# Patient Record
Sex: MALE | Race: WHITE | Employment: OTHER | URBAN - METROPOLITAN AREA
[De-identification: names, ages, dates, MRNs, and addresses within clinical notes are randomized per-mention and may not be internally consistent; named-entity substitution may affect disease eponyms.]

---

## 2020-11-17 ENCOUNTER — OFFICE VISIT (OUTPATIENT)
Dept: PRIMARY CARE CLINIC | Age: 55
End: 2020-11-17
Payer: COMMERCIAL

## 2020-11-17 VITALS
HEART RATE: 71 BPM | BODY MASS INDEX: 33.74 KG/M2 | HEIGHT: 71 IN | RESPIRATION RATE: 18 BRPM | OXYGEN SATURATION: 98 % | SYSTOLIC BLOOD PRESSURE: 108 MMHG | TEMPERATURE: 98.5 F | DIASTOLIC BLOOD PRESSURE: 60 MMHG | WEIGHT: 241 LBS

## 2020-11-17 DIAGNOSIS — E29.1 HYPOGONADISM MALE: ICD-10-CM

## 2020-11-17 DIAGNOSIS — G44.209 TENSION HEADACHE: ICD-10-CM

## 2020-11-17 DIAGNOSIS — N63.0 BREAST LUMP: Primary | ICD-10-CM

## 2020-11-17 DIAGNOSIS — N62 GYNECOMASTIA, MALE: ICD-10-CM

## 2020-11-17 PROCEDURE — 99204 OFFICE O/P NEW MOD 45 MIN: CPT | Performed by: NURSE PRACTITIONER

## 2020-11-17 RX ORDER — LISINOPRIL AND HYDROCHLOROTHIAZIDE 12.5; 2 MG/1; MG/1
TABLET ORAL
COMMUNITY
Start: 2020-10-04 | End: 2021-07-20 | Stop reason: SDUPTHER

## 2020-11-17 RX ORDER — HYDROGEN PEROXIDE 3 %
20 SOLUTION, NON-ORAL MISCELLANEOUS 2 TIMES DAILY
COMMUNITY
End: 2022-10-18 | Stop reason: ALTCHOICE

## 2020-11-17 RX ORDER — TESTOSTERONE CYPIONATE 100 MG/ML
INJECTION, SOLUTION INTRAMUSCULAR ONCE
COMMUNITY
End: 2020-11-23 | Stop reason: SDUPTHER

## 2020-11-17 RX ORDER — ATORVASTATIN CALCIUM 20 MG/1
20 TABLET, FILM COATED ORAL DAILY
COMMUNITY
Start: 2020-10-04 | End: 2021-06-23 | Stop reason: SDUPTHER

## 2020-11-17 NOTE — PROGRESS NOTES
HISTORY OF PRESENT ILLNESS  Shruthi Wylie is a 48 y.o. male presents to [facility] care and for knot under breast.    1.  Knot under nipple:  Patient reported that he has had a knot under left nipple for about 6 months. Patient reported that the knot is very tender and painful. Patient denies discharge from the nipple. Patient notes the pain is like someone stabbing him with a needle in his left nipple. 2. Headaches:  Patient reported that he has been having headaches for about 6 months as well. Patient reported he gets pain behind his eyes. Patient has been using advil with some relief in headaches. Patient also notes blurry vision with headaches. Has been to ent and they told him this isn't related to his sinus. Patient hasn't been to an eye doctor in years. Denies nausea. 3. Low testosterone:  Patient reported that he been having low testosterone. Stopped his testosterone when he started having the nipple pain but he's feeling fatigued and depressed since being off of it. Vitals:    11/17/20 1015   BP: 108/60   Pulse: 71   Resp: 18   Temp: 98.5 °F (36.9 °C)   TempSrc: Temporal   SpO2: 98%   Weight: 241 lb (109.3 kg)   Height: 5' 11\" (1.803 m)     There is no problem list on file for this patient. There are no active problems to display for this patient. Current Outpatient Medications   Medication Sig Dispense Refill    atorvastatin (LIPITOR) 20 mg tablet Take 20 mg by mouth daily.  lisinopril-hydroCHLOROthiazide (PRINZIDE, ZESTORETIC) 20-12.5 mg per tablet TAKE 1 TABLET BY MOUTH EVERY DAY      esomeprazole (NexIUM) 20 mg capsule Take 20 mg by mouth two (2) times a day.  testosterone cypionate (DEPO-TESTOSTERONE) 100 mg/mL injection by IntraMUSCular route once.        Allergies   Allergen Reactions    Codeine Hives    Penicillins Other (comments)     Past Medical History:   Diagnosis Date    Arthritis     left and Rt knee    GERD (gastroesophageal reflux disease)     Headache     Hypercholesterolemia     Hypertension     Testosterone deficiency in male      Past Surgical History:   Procedure Laterality Date    HX APPENDECTOMY      HX CYST REMOVAL Left     wrist    HX OTHER SURGICAL Left     knee surgery    HX OTHER SURGICAL      Left shoulder surgery    HX TONSILLECTOMY       Family History   Problem Relation Age of Onset    Diabetes Mother     Lung Disease Mother     Cancer Mother         colon    Arthritis-osteo Mother     Lung Cancer Maternal Grandmother      Social History     Tobacco Use    Smoking status: Never Smoker    Smokeless tobacco: Current User    Tobacco comment: pt vape   Substance Use Topics    Alcohol use: Never     Frequency: Never           Review of Systems   Constitutional: Negative. HENT: Negative. Eyes: Negative. Respiratory: Negative for shortness of breath. Cardiovascular: Negative for chest pain and palpitations. Gastrointestinal: Negative. Genitourinary: Negative. Musculoskeletal: Negative. Skin: Negative. Neurological: Positive for headaches. Negative for dizziness. Physical Exam  Constitutional:       Appearance: Normal appearance. He is obese. HENT:      Head: Normocephalic. Right Ear: Tympanic membrane and ear canal normal.      Left Ear: Tympanic membrane and ear canal normal.   Eyes:      Conjunctiva/sclera: Conjunctivae normal.      Pupils: Pupils are equal, round, and reactive to light. Neck:      Musculoskeletal: Normal range of motion. Cardiovascular:      Rate and Rhythm: Normal rate. Pulses: Normal pulses. Pulmonary:      Effort: Pulmonary effort is normal.      Breath sounds: Normal breath sounds. Chest:      Breasts:         Right: Tenderness present. Left: Mass (small lump under nipple) and tenderness present. Skin:     General: Skin is warm and dry. Neurological:      Mental Status: He is alert and oriented to person, place, and time.    Psychiatric: Mood and Affect: Mood normal.         Behavior: Behavior normal.           ASSESSMENT and PLAN  Diagnoses and all orders for this visit:    1. Breast lump  Comments:  most likely related to gynecomastia but will check US to r/o other causes such as malignancy or cyst.   Orders:  -     TESTOSTERONE, FREE & TOTAL  -     ESTROGENS, FRACTIONATED  -     US BREAST LT LIMITED=<3 QUAD; Future  -     METABOLIC PANEL, COMPREHENSIVE  -     CBC WITH AUTOMATED DIFF    2. Hypogonadism male  Comments:  checking testosterone level to get him restarted on meds. Orders:  -     TESTOSTERONE, FREE & TOTAL  -     METABOLIC PANEL, COMPREHENSIVE    3. Tension headache  Comments:  offered fioricet but he declined. Discussed headaches may be from low testosterone. Orders:  -     METABOLIC PANEL, COMPREHENSIVE  -     CBC WITH AUTOMATED DIFF    4. Gynecomastia, male  Comments:  Check estrogen and testosterone as well as liver function.           Ozzie Coppola, NP

## 2020-11-20 LAB
ALBUMIN SERPL-MCNC: 4.6 G/DL (ref 3.8–4.9)
ALBUMIN/GLOB SERPL: 1.8 {RATIO} (ref 1.2–2.2)
ALP SERPL-CCNC: 72 IU/L (ref 39–117)
ALT SERPL-CCNC: 28 IU/L (ref 0–44)
AST SERPL-CCNC: 19 IU/L (ref 0–40)
BASOPHILS # BLD AUTO: 0.1 X10E3/UL (ref 0–0.2)
BASOPHILS NFR BLD AUTO: 1 %
BILIRUB SERPL-MCNC: 0.6 MG/DL (ref 0–1.2)
BUN SERPL-MCNC: 13 MG/DL (ref 6–24)
BUN/CREAT SERPL: 12 (ref 9–20)
CALCIUM SERPL-MCNC: 9.7 MG/DL (ref 8.7–10.2)
CHLORIDE SERPL-SCNC: 101 MMOL/L (ref 96–106)
CO2 SERPL-SCNC: 21 MMOL/L (ref 20–29)
CREAT SERPL-MCNC: 1.06 MG/DL (ref 0.76–1.27)
EOSINOPHIL # BLD AUTO: 0.1 X10E3/UL (ref 0–0.4)
EOSINOPHIL NFR BLD AUTO: 2 %
ERYTHROCYTE [DISTWIDTH] IN BLOOD BY AUTOMATED COUNT: 12.7 % (ref 11.6–15.4)
ESTRADIOL SERPL-MCNC: 17.7 PG/ML (ref 7.6–42.6)
ESTRONE SERPL-MCNC: 86 PG/ML (ref 15–65)
GLOBULIN SER CALC-MCNC: 2.5 G/DL (ref 1.5–4.5)
GLUCOSE SERPL-MCNC: 95 MG/DL (ref 65–99)
HCT VFR BLD AUTO: 44.7 % (ref 37.5–51)
HGB BLD-MCNC: 15.5 G/DL (ref 13–17.7)
IMM GRANULOCYTES # BLD AUTO: 0.1 X10E3/UL (ref 0–0.1)
IMM GRANULOCYTES NFR BLD AUTO: 1 %
LYMPHOCYTES # BLD AUTO: 1.9 X10E3/UL (ref 0.7–3.1)
LYMPHOCYTES NFR BLD AUTO: 21 %
MCH RBC QN AUTO: 30.5 PG (ref 26.6–33)
MCHC RBC AUTO-ENTMCNC: 34.7 G/DL (ref 31.5–35.7)
MCV RBC AUTO: 88 FL (ref 79–97)
MONOCYTES # BLD AUTO: 0.8 X10E3/UL (ref 0.1–0.9)
MONOCYTES NFR BLD AUTO: 9 %
NEUTROPHILS # BLD AUTO: 6 X10E3/UL (ref 1.4–7)
NEUTROPHILS NFR BLD AUTO: 66 %
PLATELET # BLD AUTO: 299 X10E3/UL (ref 150–450)
POTASSIUM SERPL-SCNC: 4.5 MMOL/L (ref 3.5–5.2)
PROT SERPL-MCNC: 7.1 G/DL (ref 6–8.5)
RBC # BLD AUTO: 5.09 X10E6/UL (ref 4.14–5.8)
SODIUM SERPL-SCNC: 139 MMOL/L (ref 134–144)
TESTOST FREE SERPL-MCNC: 6 PG/ML (ref 7.2–24)
TESTOST SERPL-MCNC: 238 NG/DL (ref 264–916)
WBC # BLD AUTO: 8.9 X10E3/UL (ref 3.4–10.8)

## 2020-11-23 DIAGNOSIS — E29.1 HYPOGONADISM MALE: Primary | ICD-10-CM

## 2020-11-23 RX ORDER — TESTOSTERONE CYPIONATE 100 MG/ML
200 INJECTION, SOLUTION INTRAMUSCULAR EVERY 2 WEEKS
Qty: 12 ML | Refills: 0 | Status: SHIPPED | OUTPATIENT
Start: 2020-11-23 | End: 2021-01-26

## 2020-11-25 ENCOUNTER — HOSPITAL ENCOUNTER (OUTPATIENT)
Dept: MAMMOGRAPHY | Age: 55
Discharge: HOME | End: 2020-11-25
Payer: COMMERCIAL

## 2020-11-25 ENCOUNTER — TRANSCRIBE ORDER (OUTPATIENT)
Dept: MAMMOGRAPHY | Age: 55
End: 2020-11-25

## 2020-11-25 DIAGNOSIS — N63.24 UNSPECIFIED LUMP IN THE LEFT BREAST, LOWER INNER QUADRANT: Primary | ICD-10-CM

## 2020-11-25 DIAGNOSIS — N63.0 BREAST LUMP: ICD-10-CM

## 2020-11-25 DIAGNOSIS — N63.24 UNSPECIFIED LUMP IN THE LEFT BREAST, LOWER INNER QUADRANT: ICD-10-CM

## 2020-11-25 DIAGNOSIS — N63.20 LEFT BREAST LUMP: Primary | ICD-10-CM

## 2020-11-25 PROCEDURE — 77066 DX MAMMO INCL CAD BI: CPT

## 2021-01-23 DIAGNOSIS — E29.1 HYPOGONADISM MALE: ICD-10-CM

## 2021-01-26 RX ORDER — TESTOSTERONE CYPIONATE 100 MG/ML
INJECTION, SOLUTION INTRAMUSCULAR
Qty: 10 ML | Refills: 0 | Status: SHIPPED | OUTPATIENT
Start: 2021-01-26 | End: 2021-02-11 | Stop reason: SDUPTHER

## 2021-01-26 NOTE — TELEPHONE ENCOUNTER
Sent in his testosterone refill but I would like to check his levels while on the medication. Please have him schedule an appointment (can be virtual), thank you.

## 2021-02-09 ENCOUNTER — OFFICE VISIT (OUTPATIENT)
Dept: PRIMARY CARE CLINIC | Age: 56
End: 2021-02-09
Payer: COMMERCIAL

## 2021-02-09 VITALS
RESPIRATION RATE: 16 BRPM | OXYGEN SATURATION: 99 % | HEART RATE: 84 BPM | TEMPERATURE: 97.7 F | DIASTOLIC BLOOD PRESSURE: 63 MMHG | SYSTOLIC BLOOD PRESSURE: 111 MMHG | WEIGHT: 239 LBS | BODY MASS INDEX: 32.37 KG/M2 | HEIGHT: 72 IN

## 2021-02-09 DIAGNOSIS — F51.01 PRIMARY INSOMNIA: ICD-10-CM

## 2021-02-09 DIAGNOSIS — F33.0 MILD EPISODE OF RECURRENT MAJOR DEPRESSIVE DISORDER (HCC): Primary | ICD-10-CM

## 2021-02-09 DIAGNOSIS — E29.1 HYPOGONADISM MALE: ICD-10-CM

## 2021-02-09 DIAGNOSIS — E78.2 MIXED HYPERLIPIDEMIA: ICD-10-CM

## 2021-02-09 DIAGNOSIS — R53.83 FATIGUE, UNSPECIFIED TYPE: ICD-10-CM

## 2021-02-09 PROCEDURE — 99214 OFFICE O/P EST MOD 30 MIN: CPT | Performed by: NURSE PRACTITIONER

## 2021-02-09 RX ORDER — VILAZODONE HYDROCHLORIDE 20 MG/1
TABLET ORAL
Qty: 30 TAB | Refills: 1 | Status: SHIPPED | OUTPATIENT
Start: 2021-02-09 | End: 2021-07-20

## 2021-02-09 NOTE — PROGRESS NOTES
HISTORY OF PRESENT ILLNESS  Philip Tony is a 48 y.o. male presents for testosterone follow up, depression and fatigue. Patient reported that his wife gives him his testosterone injections every 2 weeks. Last dose was on 1/30. Patient reported that he has noticed improvement in his breast pain but has not noticed a difference in his energy levels on this dose. Patient reported that he has been having heartburn that is not relieved by nexium anymore. Patient notes that he wakes frequently in the middle of the night and can't get back to sleep. Patient notes that he goes to sleep at 8 pm because he is so fatigued. Patient will wake in the middle night and can't get back to sleep. Patient reported that he is feeling depressed. Patient reported that he has been down. He used to take zoloft 100 mg in the past but stopped taking it because he felt like he was taking too many medications. Prozac made him feel bad. Patient has hx of hyperlipidemia but has not been taking his statin medication because he felt like he was on too many medications. Vitals:    02/09/21 0702   BP: 111/63   Pulse: 84   Resp: 16   Temp: 97.7 °F (36.5 °C)   TempSrc: Temporal   SpO2: 99%   Weight: 239 lb (108.4 kg)   Height: 5' 11.5\" (1.816 m)     There is no problem list on file for this patient. There are no active problems to display for this patient. Current Outpatient Medications   Medication Sig Dispense Refill    vilazodone (Viibryd) 20 mg tab tablet Take 1/2 tablet by mouth x1 week then take 1 tablet by mouth daily. 30 Tab 1    testosterone cypionate (DEPO-TESTOSTERONE) 100 mg/mL injection INJECT 200 MG (2ML) INTRAMUSCULARLY ONCE EVERY 2 WEEKS. DISCARD AFTER 28 DAYS 10 mL 0    atorvastatin (LIPITOR) 20 mg tablet Take 20 mg by mouth daily.       lisinopril-hydroCHLOROthiazide (PRINZIDE, ZESTORETIC) 20-12.5 mg per tablet TAKE 1 TABLET BY MOUTH EVERY DAY      esomeprazole (NexIUM) 20 mg capsule Take 20 mg by mouth two (2) times a day.       Allergies   Allergen Reactions   • Codeine Hives   • Penicillins Other (comments)     Past Medical History:   Diagnosis Date   • Arthritis     left and Rt knee   • GERD (gastroesophageal reflux disease)    • Headache    • Hypercholesterolemia    • Hypertension    • Testosterone deficiency in male      Past Surgical History:   Procedure Laterality Date   • HX APPENDECTOMY     • HX CYST REMOVAL Left     wrist   • HX OTHER SURGICAL Left     knee surgery   • HX OTHER SURGICAL      Left shoulder surgery   • HX TONSILLECTOMY       Family History   Problem Relation Age of Onset   • Diabetes Mother    • Lung Disease Mother    • Cancer Mother         colon   • Arthritis-osteo Mother    • Lung Cancer Maternal Grandmother      Social History     Tobacco Use   • Smoking status: Never Smoker   • Smokeless tobacco: Current User   • Tobacco comment: pt vape   Substance Use Topics   • Alcohol use: Never     Frequency: Never           Review of Systems   Constitutional: Positive for malaise/fatigue. Negative for weight loss.   Eyes: Negative for blurred vision, double vision and photophobia.   Respiratory: Negative for shortness of breath.    Cardiovascular: Negative for chest pain and palpitations.   Gastrointestinal: Positive for heartburn. Negative for abdominal pain, constipation, diarrhea, nausea and vomiting.   Genitourinary: Positive for frequency. Negative for dysuria.        Dribbling     Musculoskeletal: Negative for joint pain and myalgias.   Neurological: Negative for dizziness, tingling and headaches.   Endo/Heme/Allergies: Does not bruise/bleed easily.   Psychiatric/Behavioral: Positive for depression. The patient has insomnia. The patient is not nervous/anxious.        Physical Exam  Constitutional:       Appearance: Normal appearance. He is obese.   HENT:      Head: Normocephalic and atraumatic.   Eyes:      Extraocular Movements: Extraocular movements intact.       Conjunctiva/sclera: Conjunctivae normal.      Pupils: Pupils are equal, round, and reactive to light. Cardiovascular:      Rate and Rhythm: Normal rate and regular rhythm. Pulses: Normal pulses. Pulmonary:      Effort: Pulmonary effort is normal.      Breath sounds: Normal breath sounds. Musculoskeletal: Normal range of motion. Skin:     General: Skin is warm and dry. Neurological:      General: No focal deficit present. Mental Status: He is alert and oriented to person, place, and time. Psychiatric:         Mood and Affect: Mood is depressed. Behavior: Behavior normal.           ASSESSMENT and PLAN  Diagnoses and all orders for this visit:    1. Mild episode of recurrent major depressive disorder ([facility] [address]  Comments:  was on zoloft in past but it stopped working. Start on viibryd. Orders:  -     vilazodone (Viibryd) 20 mg tab tablet; Take 1/2 tablet by mouth x1 week then take 1 tablet by mouth daily. 2. Hypogonadism male  Comments:  recheck testosterone level. Headaches and breast pain has resolved but still very fatigued. Orders:  -     TESTOSTERONE, FREE & TOTAL  -     PSA W/ REFLX FREE PSA    3. Mixed hyperlipidemia  Comments:  has not been taking his statin. Will check fasting lipids today. Orders:  -     LIPID PANEL    4. Fatigue, unspecified type  Comments:  low testosterone verse. depression? or both. Encouraged exercise today to help. 5. Primary insomnia  Comments:  frequent wakings at night. does not want medication at this time.           Precious Tang NP

## 2021-02-11 DIAGNOSIS — E29.1 HYPOGONADISM MALE: ICD-10-CM

## 2021-02-11 LAB
CHOLEST SERPL-MCNC: 200 MG/DL (ref 100–199)
HDLC SERPL-MCNC: 33 MG/DL
LDLC SERPL CALC-MCNC: 129 MG/DL (ref 0–99)
PSA SERPL-MCNC: 1.8 NG/ML (ref 0–4)
REFLEX CRITERIA: NORMAL
TESTOST FREE SERPL-MCNC: 16.5 PG/ML (ref 7.2–24)
TESTOST SERPL-MCNC: 631 NG/DL (ref 264–916)
TRIGL SERPL-MCNC: 211 MG/DL (ref 0–149)
VLDLC SERPL CALC-MCNC: 38 MG/DL (ref 5–40)

## 2021-02-11 RX ORDER — TESTOSTERONE CYPIONATE 100 MG/ML
200 INJECTION, SOLUTION INTRAMUSCULAR EVERY 2 WEEKS
Qty: 5 ML | Refills: 4 | Status: SHIPPED | OUTPATIENT
Start: 2021-02-11 | End: 2021-08-30

## 2021-02-11 NOTE — PROGRESS NOTES
Please call the patient regarding his abnormal result. Testosterone level is in normal range so we are going to keep him on his current dose. Cholesterol levels are high. I want him to restart his medication.      Prostate screening test is normal.

## 2021-06-23 ENCOUNTER — TELEPHONE (OUTPATIENT)
Dept: PRIMARY CARE CLINIC | Age: 56
End: 2021-06-23

## 2021-06-23 DIAGNOSIS — E78.2 MIXED HYPERLIPIDEMIA: Primary | ICD-10-CM

## 2021-06-23 RX ORDER — ATORVASTATIN CALCIUM 20 MG/1
20 TABLET, FILM COATED ORAL DAILY
Qty: 30 TABLET | Refills: 0 | Status: SHIPPED | OUTPATIENT
Start: 2021-06-23 | End: 2021-07-20 | Stop reason: SDUPTHER

## 2021-07-20 ENCOUNTER — OFFICE VISIT (OUTPATIENT)
Dept: PRIMARY CARE CLINIC | Age: 56
End: 2021-07-20
Payer: COMMERCIAL

## 2021-07-20 VITALS
HEIGHT: 72 IN | RESPIRATION RATE: 18 BRPM | SYSTOLIC BLOOD PRESSURE: 118 MMHG | WEIGHT: 236.4 LBS | DIASTOLIC BLOOD PRESSURE: 78 MMHG | HEART RATE: 92 BPM | BODY MASS INDEX: 32.02 KG/M2 | OXYGEN SATURATION: 96 % | TEMPERATURE: 98 F

## 2021-07-20 DIAGNOSIS — J40 BRONCHITIS: ICD-10-CM

## 2021-07-20 DIAGNOSIS — E78.2 MIXED HYPERLIPIDEMIA: ICD-10-CM

## 2021-07-20 DIAGNOSIS — E29.1 HYPOGONADISM MALE: ICD-10-CM

## 2021-07-20 DIAGNOSIS — I10 ESSENTIAL HYPERTENSION: ICD-10-CM

## 2021-07-20 DIAGNOSIS — G47.33 OBSTRUCTIVE SLEEP APNEA: ICD-10-CM

## 2021-07-20 DIAGNOSIS — F33.0 MILD EPISODE OF RECURRENT MAJOR DEPRESSIVE DISORDER (HCC): Primary | ICD-10-CM

## 2021-07-20 DIAGNOSIS — R53.83 FATIGUE, UNSPECIFIED TYPE: ICD-10-CM

## 2021-07-20 PROBLEM — U07.1 COVID-19: Status: ACTIVE | Noted: 2021-02-01

## 2021-07-20 PROCEDURE — 99215 OFFICE O/P EST HI 40 MIN: CPT | Performed by: NURSE PRACTITIONER

## 2021-07-20 RX ORDER — LISINOPRIL AND HYDROCHLOROTHIAZIDE 12.5; 2 MG/1; MG/1
1 TABLET ORAL DAILY
Qty: 90 TABLET | Refills: 1 | Status: SHIPPED | OUTPATIENT
Start: 2021-07-20 | End: 2022-01-15

## 2021-07-20 RX ORDER — NEEDLES, SAFETY 18GX1"
1 NEEDLE, DISPOSABLE MISCELLANEOUS EVERY 2 WEEKS
Qty: 10 SYRINGE | Refills: 2 | Status: SHIPPED | OUTPATIENT
Start: 2021-07-20 | End: 2022-09-14 | Stop reason: ALTCHOICE

## 2021-07-20 RX ORDER — ATORVASTATIN CALCIUM 20 MG/1
20 TABLET, FILM COATED ORAL DAILY
Qty: 90 TABLET | Refills: 1 | Status: SHIPPED | OUTPATIENT
Start: 2021-07-20 | End: 2022-02-22

## 2021-07-20 RX ORDER — ARIPIPRAZOLE 2 MG/1
2 TABLET ORAL DAILY
Qty: 30 TABLET | Refills: 0 | Status: SHIPPED | OUTPATIENT
Start: 2021-07-20 | End: 2021-09-16

## 2021-07-20 RX ORDER — ALBUTEROL SULFATE 90 UG/1
1 AEROSOL, METERED RESPIRATORY (INHALATION)
Qty: 1 INHALER | Refills: 2 | Status: SHIPPED | OUTPATIENT
Start: 2021-07-20 | End: 2022-09-14 | Stop reason: ALTCHOICE

## 2021-07-20 NOTE — PROGRESS NOTES
Chief Complaint   Patient presents with    Follow Up Chronic Condition     pt is asking for refills on atorvastin and lisinopril/htcz . pt states he has been taking 1m, of the testostone instead of two      1. Have you been to the ER, urgent care clinic since your last visit? Hospitalized since your last visit?no    2. Have you seen or consulted any other health care providers outside of the [address] [place] [address] since your last visit? Include any pap smears or colon screening.  No  Visit Vitals  /78 (BP 1 Location: Right arm, BP Patient Position: At rest, BP Cuff Size: Adult)   Pulse 92   Temp 98 °F (36.7 °C) (Temporal)   Resp 18   Ht 5' 11.5\" (1.816 m)   Wt 236 lb 6.4 oz (107.2 kg)   SpO2 96%   BMI 32.51 kg/m²

## 2021-07-20 NOTE — PROGRESS NOTES
HISTORY OF PRESENT ILLNESS  Jacky Vargas is a 48 y.o. male presents for chronic OV. 1. Low Testosterone: Patient reported that his wife gives him his testosterone injections every 2 weeks. Last dose was on 7/16. Patient reported he lowered himself to 1 mg dose instead of 2 mg because he was getting angry on the 2 mg dose. This past Friday was the first time he did 1 mg dose. 2. Hypertension:  Patient reported that his BP is well controlled on lisinopril-HCTZ. Denies headaches, vision changes or dizziness. 3. Sleep issue: Patient notes that he wakes frequently in the middle of the night and can't get back to sleep. Patient notes that he goes to sleep at 8 pm because he is so fatigued. Patient will wake in the middle night and can't get back to sleep. Patient has been dx with sleep apnea but does not wear cpap nightly. 4. Depression: Patient reported that he is feeling depressed. Patient reported that he has been down. He used to take zoloft 100 mg in the past but stopped taking it because he felt like he was taking too many medications. Prozac made him feel bad. Started on viibyrd but patient reported he didn't feel like this helps. Feels overwhelmed very easily and gets panic/anxiety moments which then spiral him into a depression. 5. Mixed hyperlipidemia: Patient has restarted his atorvastatin. Vitals:    07/20/21 0723   BP: 118/78   Pulse: 92   Resp: 18   Temp: 98 °F (36.7 °C)   TempSrc: Temporal   SpO2: 96%   Weight: 236 lb 6.4 oz (107.2 kg)   Height: 5' 11.5\" (1.816 m)     Patient Active Problem List   Diagnosis Code    COVID-19 U07.1     Patient Active Problem List    Diagnosis Date Noted    COVID-19 02/2021     Current Outpatient Medications   Medication Sig Dispense Refill    ARIPiprazole (ABILIFY) 2 mg tablet Take 1 Tablet by mouth daily. 30 Tablet 0    atorvastatin (LIPITOR) 20 mg tablet Take 1 Tablet by mouth daily.  90 Tablet 1    lisinopril-hydroCHLOROthiazide (PRINZIDE, ZESTORETIC) 20-12.5 mg per tablet Take 1 Tablet by mouth daily. 90 Tablet 1    Syringe with Needle, Safety (SurGuard2 Safety) 3 mL 20 gauge x 1 1/2\" syrg 1 Syringe by Does Not Apply route Once every 2 weeks. 10 Syringe 2    albuterol (PROVENTIL HFA, VENTOLIN HFA, PROAIR HFA) 90 mcg/actuation inhaler Take 1 Puff by inhalation every four (4) hours as needed for Wheezing. 1 Inhaler 2    testosterone cypionate (DEPO-TESTOSTERONE) 100 mg/mL injection 200 mg by IntraMUSCular route Once every 2 weeks. Max Daily Amount: 200 mg. 5 mL 4    esomeprazole (NexIUM) 20 mg capsule Take 20 mg by mouth two (2) times a day. Allergies   Allergen Reactions    Codeine Hives    Penicillins Other (comments)     Past Medical History:   Diagnosis Date    Arthritis     left and Rt knee    COVID-19 02/2021    GERD (gastroesophageal reflux disease)     Headache     Hypercholesterolemia     Hypertension     Testosterone deficiency in male      Past Surgical History:   Procedure Laterality Date    HX APPENDECTOMY      HX CYST REMOVAL Left     wrist    HX OTHER SURGICAL Left     knee surgery    HX OTHER SURGICAL      Left shoulder surgery    HX TONSILLECTOMY       Family History   Problem Relation Age of Onset    Diabetes Mother     Lung Disease Mother     Cancer Mother         colon    Arthritis-osteo Mother     Lung Cancer Maternal Grandmother      Social History     Tobacco Use    Smoking status: Never Smoker    Smokeless tobacco: Current User    Tobacco comment: pt vape   Substance Use Topics    Alcohol use: Never           Review of Systems   Constitutional: Positive for malaise/fatigue. Negative for weight loss. Eyes: Negative for blurred vision, double vision and photophobia. Respiratory: Positive for wheezing. Negative for shortness of breath. Cardiovascular: Negative for chest pain and palpitations. Gastrointestinal: Positive for heartburn.  Negative for abdominal pain, constipation, diarrhea, nausea and vomiting. Genitourinary: Negative for dysuria. Musculoskeletal: Negative for joint pain and myalgias. Neurological: Negative for dizziness, tingling and headaches. Endo/Heme/Allergies: Does not bruise/bleed easily. Psychiatric/Behavioral: Positive for depression. The patient has insomnia. The patient is not nervous/anxious. Physical Exam  Constitutional:       Appearance: Normal appearance. He is obese. HENT:      Head: Normocephalic and atraumatic. Eyes:      Extraocular Movements: Extraocular movements intact. Conjunctiva/sclera: Conjunctivae normal.      Pupils: Pupils are equal, round, and reactive to light. Cardiovascular:      Rate and Rhythm: Normal rate and regular rhythm. Pulses: Normal pulses. Pulmonary:      Effort: Pulmonary effort is normal.      Breath sounds: Wheezing present. Musculoskeletal:         General: Normal range of motion. Skin:     General: Skin is warm and dry. Neurological:      General: No focal deficit present. Mental Status: He is alert and oriented to person, place, and time. Psychiatric:         Mood and Affect: Mood is depressed. Behavior: Behavior normal.           ASSESSMENT and PLAN  Diagnoses and all orders for this visit:    1. Mild episode of recurrent major depressive disorder ([facility] [address]  Comments:  start on abilify. If no improvement, will consider zoloft again as this has worked in past but can take 3-4 weeks to become effective. Orders:  -     ARIPiprazole (ABILIFY) 2 mg tablet; Take 1 Tablet by mouth daily.  -     CBC WITH AUTOMATED DIFF    2. Mixed hyperlipidemia  Comments:  check labs. Orders:  -     atorvastatin (LIPITOR) 20 mg tablet; Take 1 Tablet by mouth daily.  -     METABOLIC PANEL, COMPREHENSIVE  -     LIPID PANEL    3. Essential hypertension  Comments:  stable on current medication.    Orders:  -     lisinopril-hydroCHLOROthiazide (PRINZIDE, ZESTORETIC) 20-12.5 mg per tablet; Take 1 Tablet by mouth daily. 4. Fatigue, unspecified type  Comments:  checking labs. Orders:  -     TSH 3RD GENERATION  -     VITAMIN D, 25 HYDROXY    5. Hypogonadism male  Comments:  check testostone, has lowered his dose due to anger on higher dose. Orders:  -     TESTOSTERONE, FREE & TOTAL  -     Syringe with Needle, Safety (SurGuard2 Safety) 3 mL 20 gauge x 1 1/2\" syrg; 1 Syringe by Does Not Apply route Once every 2 weeks. 6. Obstructive sleep apnea  Comments:  encouraged compliance with cpap machine, daytime fatigue could be a result of sleep apnea. pt agrees to plan. 7. Bronchitis  Comments:  wheezing on exam.  Discussed tobacco cessation. albuterol to use for wheezing. Orders:  -     albuterol (PROVENTIL HFA, VENTOLIN HFA, PROAIR HFA) 90 mcg/actuation inhaler; Take 1 Puff by inhalation every four (4) hours as needed for Wheezing. Last PDMP Dacia Anamaria as Reviewed:  Review User Review Instant Review Result   Petty Helms 7/20/2021  8:12 AM Reviewed PDMP [1]        On this date 07/20/2021 I have spent 45 minutes reviewing previous notes, test results and face to face with the patient discussing the diagnosis and importance of compliance with the treatment plan as well as documenting on the day of the visit.     Marianne Young NP

## 2021-07-24 LAB
25(OH)D3+25(OH)D2 SERPL-MCNC: 33.2 NG/ML (ref 30–100)
ALBUMIN SERPL-MCNC: 4.3 G/DL (ref 3.8–4.9)
ALBUMIN/GLOB SERPL: 1.9 {RATIO} (ref 1.2–2.2)
ALP SERPL-CCNC: 65 IU/L (ref 48–121)
ALT SERPL-CCNC: 24 IU/L (ref 0–44)
AST SERPL-CCNC: 20 IU/L (ref 0–40)
BASOPHILS # BLD AUTO: 0.1 X10E3/UL (ref 0–0.2)
BASOPHILS NFR BLD AUTO: 1 %
BILIRUB SERPL-MCNC: 0.6 MG/DL (ref 0–1.2)
BUN SERPL-MCNC: 15 MG/DL (ref 6–24)
BUN/CREAT SERPL: 21 (ref 9–20)
CALCIUM SERPL-MCNC: 9.1 MG/DL (ref 8.7–10.2)
CHLORIDE SERPL-SCNC: 102 MMOL/L (ref 96–106)
CHOLEST SERPL-MCNC: 150 MG/DL (ref 100–199)
CO2 SERPL-SCNC: 20 MMOL/L (ref 20–29)
CREAT SERPL-MCNC: 0.72 MG/DL (ref 0.76–1.27)
EOSINOPHIL # BLD AUTO: 0.1 X10E3/UL (ref 0–0.4)
EOSINOPHIL NFR BLD AUTO: 1 %
ERYTHROCYTE [DISTWIDTH] IN BLOOD BY AUTOMATED COUNT: 12.7 % (ref 11.6–15.4)
GLOBULIN SER CALC-MCNC: 2.3 G/DL (ref 1.5–4.5)
GLUCOSE SERPL-MCNC: 104 MG/DL (ref 65–99)
HCT VFR BLD AUTO: 46.9 % (ref 37.5–51)
HDLC SERPL-MCNC: 37 MG/DL
HGB BLD-MCNC: 15.7 G/DL (ref 13–17.7)
IMM GRANULOCYTES # BLD AUTO: 0.1 X10E3/UL (ref 0–0.1)
IMM GRANULOCYTES NFR BLD AUTO: 1 %
LDLC SERPL CALC-MCNC: 97 MG/DL (ref 0–99)
LYMPHOCYTES # BLD AUTO: 1.8 X10E3/UL (ref 0.7–3.1)
LYMPHOCYTES NFR BLD AUTO: 17 %
MCH RBC QN AUTO: 31.3 PG (ref 26.6–33)
MCHC RBC AUTO-ENTMCNC: 33.5 G/DL (ref 31.5–35.7)
MCV RBC AUTO: 94 FL (ref 79–97)
MONOCYTES # BLD AUTO: 1 X10E3/UL (ref 0.1–0.9)
MONOCYTES NFR BLD AUTO: 10 %
NEUTROPHILS # BLD AUTO: 7.6 X10E3/UL (ref 1.4–7)
NEUTROPHILS NFR BLD AUTO: 70 %
PLATELET # BLD AUTO: 274 X10E3/UL (ref 150–450)
POTASSIUM SERPL-SCNC: 4.8 MMOL/L (ref 3.5–5.2)
PROT SERPL-MCNC: 6.6 G/DL (ref 6–8.5)
RBC # BLD AUTO: 5.01 X10E6/UL (ref 4.14–5.8)
SODIUM SERPL-SCNC: 136 MMOL/L (ref 134–144)
TESTOST FREE SERPL-MCNC: 3.4 PG/ML (ref 7.2–24)
TESTOST SERPL-MCNC: 154 NG/DL (ref 264–916)
TRIGL SERPL-MCNC: 81 MG/DL (ref 0–149)
TSH SERPL DL<=0.005 MIU/L-ACNC: 2.01 UIU/ML (ref 0.45–4.5)
VLDLC SERPL CALC-MCNC: 16 MG/DL (ref 5–40)
WBC # BLD AUTO: 10.6 X10E3/UL (ref 3.4–10.8)

## 2021-07-26 NOTE — PROGRESS NOTES
Lab show that cholesterol levels have improved significantly since restarting his medication. Testosterone is a little low but I know he recently lowered his dose. I would prefer for him to stay on the lower dose as opposed to the side effects he was experiencing at higher levels. I still think his fatigue is attributed to his sleep apnea so lets stick with the plan to restart cpap and reassess how he is feeling in a month.

## 2021-08-29 ENCOUNTER — TELEPHONE (OUTPATIENT)
Dept: PRIMARY CARE CLINIC | Age: 56
End: 2021-08-29

## 2021-08-29 DIAGNOSIS — E29.1 HYPOGONADISM MALE: ICD-10-CM

## 2021-08-30 RX ORDER — TESTOSTERONE CYPIONATE 100 MG/ML
100 INJECTION, SOLUTION INTRAMUSCULAR EVERY 2 WEEKS
Qty: 2 ML | Refills: 5 | Status: SHIPPED | OUTPATIENT
Start: 2021-08-30 | End: 2021-08-31 | Stop reason: ALTCHOICE

## 2021-08-30 NOTE — TELEPHONE ENCOUNTER
Patient would like you to give him a call before refilling his prescription. There are some things he wants you to know about him taking this medication.

## 2021-08-30 NOTE — TELEPHONE ENCOUNTER
The pharmacy is asking for clarification on this script.  They state its only available as 10ml vial. This info came as a paper request

## 2021-08-31 RX ORDER — TESTOSTERONE ENANTHATE 200 MG/ML
100 VIAL (ML) INTRAMUSCULAR EVERY 2 WEEKS
Qty: 1 ML | Refills: 5 | Status: SHIPPED | OUTPATIENT
Start: 2021-08-31 | End: 2021-09-21 | Stop reason: ALTCHOICE

## 2021-09-16 ENCOUNTER — TELEPHONE (OUTPATIENT)
Dept: PRIMARY CARE CLINIC | Age: 56
End: 2021-09-16

## 2021-09-16 DIAGNOSIS — E29.1 HYPOGONADISM MALE: ICD-10-CM

## 2021-09-16 DIAGNOSIS — F33.0 MILD EPISODE OF RECURRENT MAJOR DEPRESSIVE DISORDER (HCC): ICD-10-CM

## 2021-09-16 RX ORDER — ARIPIPRAZOLE 2 MG/1
TABLET ORAL
Qty: 30 TABLET | Refills: 0 | Status: SHIPPED | OUTPATIENT
Start: 2021-09-16 | End: 2021-10-25 | Stop reason: SDUPTHER

## 2021-09-16 NOTE — TELEPHONE ENCOUNTER
I purposely changed his prescription and I've already talked to the pharmacy about this change. Does he need a PA now that it is different amount?

## 2021-09-16 NOTE — TELEPHONE ENCOUNTER
Patient says he tried to pick his testosterone med up and the insurance company will not pay for it. **[facility] called to say that the testoerone you called in is different from before and would like Clarification before it is filled.

## 2021-09-21 RX ORDER — TESTOSTERONE CYPIONATE 200 MG/ML
100 INJECTION INTRAMUSCULAR EVERY 2 WEEKS
Qty: 2 ML | Refills: 5 | Status: SHIPPED | OUTPATIENT
Start: 2021-09-21 | End: 2022-10-18 | Stop reason: ALTCHOICE

## 2021-09-21 NOTE — TELEPHONE ENCOUNTER
Spoke to pharmacy. Got correct dose sent over. 200 mg/ml comes in 1 ml vial but they are single use.   He will have to use 0.5 ml every 2 weeks and discard the rest

## 2021-10-25 DIAGNOSIS — F33.0 MILD EPISODE OF RECURRENT MAJOR DEPRESSIVE DISORDER (HCC): ICD-10-CM

## 2021-10-25 RX ORDER — ARIPIPRAZOLE 2 MG/1
2 TABLET ORAL DAILY
Qty: 90 TABLET | Refills: 0 | Status: SHIPPED | OUTPATIENT
Start: 2021-10-25 | End: 2022-09-14 | Stop reason: ALTCHOICE

## 2022-01-03 ENCOUNTER — TELEPHONE (OUTPATIENT)
Dept: PRIMARY CARE CLINIC | Age: 57
End: 2022-01-03

## 2022-01-03 NOTE — TELEPHONE ENCOUNTER
----- Message from Kevinanel Diaz sent at 12/30/2021 12:10 PM EST -----  Subject: Message to Provider    QUESTIONS  Information for Provider? Patient is having shoulder pain, that goes into   his neck. He is requesting to be seen no appointments available. Requesting a call Back   ---------------------------------------------------------------------------  --------------  CALL BACK INFO  What is the best way for the office to contact you? OK to leave message on   voicemail  Preferred Call Back Phone Number? [phone]  ---------------------------------------------------------------------------  --------------  SCRIPT ANSWERS  Relationship to Patient?  Self

## 2022-01-05 ENCOUNTER — OFFICE VISIT (OUTPATIENT)
Dept: PRIMARY CARE CLINIC | Age: 57
End: 2022-01-05
Payer: COMMERCIAL

## 2022-01-05 VITALS
BODY MASS INDEX: 33.18 KG/M2 | WEIGHT: 245 LBS | SYSTOLIC BLOOD PRESSURE: 124 MMHG | RESPIRATION RATE: 18 BRPM | OXYGEN SATURATION: 98 % | HEART RATE: 115 BPM | HEIGHT: 72 IN | DIASTOLIC BLOOD PRESSURE: 85 MMHG | TEMPERATURE: 97.5 F

## 2022-01-05 DIAGNOSIS — M54.2 NECK PAIN: ICD-10-CM

## 2022-01-05 DIAGNOSIS — M25.511 ACUTE PAIN OF RIGHT SHOULDER: ICD-10-CM

## 2022-01-05 DIAGNOSIS — R00.0 TACHYCARDIA: Primary | ICD-10-CM

## 2022-01-05 DIAGNOSIS — I45.4 BUNDLE BRANCH BLOCK: ICD-10-CM

## 2022-01-05 DIAGNOSIS — R42 DIZZINESS: ICD-10-CM

## 2022-01-05 PROCEDURE — 93000 ELECTROCARDIOGRAM COMPLETE: CPT | Performed by: NURSE PRACTITIONER

## 2022-01-05 PROCEDURE — 99214 OFFICE O/P EST MOD 30 MIN: CPT | Performed by: NURSE PRACTITIONER

## 2022-01-05 RX ORDER — DICLOFENAC SODIUM 75 MG/1
75 TABLET, DELAYED RELEASE ORAL 2 TIMES DAILY
Qty: 20 TABLET | Refills: 0 | Status: SHIPPED | OUTPATIENT
Start: 2022-01-05 | End: 2022-09-14 | Stop reason: ALTCHOICE

## 2022-01-05 RX ORDER — CYCLOBENZAPRINE HCL 10 MG
10 TABLET ORAL
Qty: 20 TABLET | Refills: 0 | Status: SHIPPED | OUTPATIENT
Start: 2022-01-05 | End: 2022-09-14 | Stop reason: ALTCHOICE

## 2022-01-05 NOTE — PROGRESS NOTES
Chief Complaint   Patient presents with    Neck Pain     Pt states having neck and shoulder pain. Pt states pain goes from shoulder to neck, and then he gets headaches. Pt states right sided pain. Pt sttaes having tingling in fingers        1. Have you been to the ER, urgent care clinic since your last visit? Hospitalized since your last visit?no    2. Have you seen or consulted any other health care providers outside of the [address] [place] [address] since your last visit? Include any pap smears or colon screening.  no    Visit Vitals  /85 (BP 1 Location: Right arm, BP Patient Position: At rest, BP Cuff Size: Adult)   Pulse (!) 115   Temp 97.5 °F (36.4 °C) (Temporal)   Resp 18   Ht 5' 11.5\" (1.816 m)   Wt 245 lb (111.1 kg)   SpO2 98%   BMI 33.69 kg/m²

## 2022-01-05 NOTE — PROGRESS NOTES
HISTORY OF PRESENT ILLNESS  Alyssa Guzman is a 55 y.o. male presents for shoulder pain and headache. Patient reported he has been having right shoulder pain x3 months. Patient notes that the pain radiates into his right arm. Causing numbness and tingling into hand. Patient notes that when he started having the shoulder pain, he also started having headaches. Patient notes that when he gets headaches he also gets dizziness ( feels wobbly). Patient reported the more he is up and moving, exerting himself, the headaches come on and the dizziness follows. Shoulder pain is constant. Vitals:    01/05/22 1309   BP: 124/85   Pulse: (!) 115   Resp: 18   Temp: 97.5 °F (36.4 °C)   TempSrc: Temporal   SpO2: 98%   Weight: 245 lb (111.1 kg)   Height: 5' 11.5\" (1.816 m)     Patient Active Problem List   Diagnosis Code    COVID-19 U07.1    Depression F32. A     Patient Active Problem List    Diagnosis Date Noted    Depression     COVID-19 02/2021     Current Outpatient Medications   Medication Sig Dispense Refill    diclofenac EC (VOLTAREN) 75 mg EC tablet Take 1 Tablet by mouth two (2) times a day. 20 Tablet 0    cyclobenzaprine (FLEXERIL) 10 mg tablet Take 1 Tablet by mouth three (3) times daily as needed for Muscle Spasm(s). 20 Tablet 0    ARIPiprazole (ABILIFY) 2 mg tablet Take 1 Tablet by mouth daily. 90 Tablet 0    testosterone cypionate (DEPOTESTOTERONE CYPIONATE) 200 mg/mL injection 0.5 mL by IntraMUSCular route Once every 2 weeks. Max Daily Amount: 100 mg. 2 mL 5    atorvastatin (LIPITOR) 20 mg tablet Take 1 Tablet by mouth daily. 90 Tablet 1    lisinopril-hydroCHLOROthiazide (PRINZIDE, ZESTORETIC) 20-12.5 mg per tablet Take 1 Tablet by mouth daily. 90 Tablet 1    Syringe with Needle, Safety (SurGuard2 Safety) 3 mL 20 gauge x 1 1/2\" syrg 1 Syringe by Does Not Apply route Once every 2 weeks.  10 Syringe 2    albuterol (PROVENTIL HFA, VENTOLIN HFA, PROAIR HFA) 90 mcg/actuation inhaler Take 1 Puff by inhalation every four (4) hours as needed for Wheezing. 1 Inhaler 2    esomeprazole (NexIUM) 20 mg capsule Take 20 mg by mouth two (2) times a day. Allergies   Allergen Reactions    Codeine Hives    Penicillins Other (comments)     Past Medical History:   Diagnosis Date    Arthritis     left and Rt knee    COVID-19 02/2021    Depression     GERD (gastroesophageal reflux disease)     Headache     Hypercholesterolemia     Hypertension     Testosterone deficiency in male      Past Surgical History:   Procedure Laterality Date    HX APPENDECTOMY      HX CYST REMOVAL Left     wrist    HX OTHER SURGICAL Left     knee surgery    HX OTHER SURGICAL      Left shoulder surgery    HX TONSILLECTOMY       Family History   Problem Relation Age of Onset    Diabetes Mother     Lung Disease Mother     Cancer Mother         colon    OSTEOARTHRITIS Mother     Lung Cancer Maternal Grandmother      Social History     Tobacco Use    Smoking status: Never Smoker    Smokeless tobacco: Current User    Tobacco comment: pt vape   Substance Use Topics    Alcohol use: Never           Review of Systems   Constitutional: Negative for malaise/fatigue and weight loss. Eyes: Negative for blurred vision and double vision. Respiratory: Negative for cough and shortness of breath. Cardiovascular: Negative for chest pain, palpitations and leg swelling. Gastrointestinal: Negative for heartburn and nausea. Musculoskeletal: Positive for joint pain and neck pain. Negative for myalgias. Skin: Negative for itching and rash. Neurological: Positive for dizziness, tingling and headaches. Negative for loss of consciousness and weakness. Endo/Heme/Allergies: Does not bruise/bleed easily. Psychiatric/Behavioral: Negative for depression. The patient is not nervous/anxious. Physical Exam  Constitutional:       Appearance: Normal appearance. HENT:      Head: Normocephalic and atraumatic.    Eyes: Extraocular Movements: Extraocular movements intact. Conjunctiva/sclera: Conjunctivae normal.      Pupils: Pupils are equal, round, and reactive to light. Cardiovascular:      Rate and Rhythm: Normal rate and regular rhythm. Pulses: Normal pulses. Pulmonary:      Effort: Pulmonary effort is normal.      Breath sounds: Normal breath sounds. Musculoskeletal:         General: Normal range of motion. Right shoulder: Tenderness present. No bony tenderness. Normal range of motion. Normal strength. Normal pulse. Cervical back: Tenderness (in muscles from neck to shoulder) present. No spasms. Normal range of motion. Back:    Skin:     General: Skin is warm and dry. Neurological:      General: No focal deficit present. Mental Status: He is alert and oriented to person, place, and time. Psychiatric:         Mood and Affect: Mood normal.         Behavior: Behavior normal.           ASSESSMENT and PLAN  Diagnoses and all orders for this visit:    1. Tachycardia  Comments:  will send to cardiology for evaluation due to HTN/cholesterol hx and BBB on EKG. Pt also notes hx of valve issue. Orders:  -     AMB POC EKG ROUTINE W/ 12 LEADS, INTER & REP  -     REFERRAL TO CARDIOLOGY    2. Dizziness  -     AMB POC EKG ROUTINE W/ 12 LEADS, INTER & REP  -     REFERRAL TO CARDIOLOGY    3. Acute pain of right shoulder  Comments:  NSAIDS to see if this reduces pain. If no improvement, will send to ortho. I believe pain is coming from neck however. Orders:  -     XR SPINE CERV 4 OR 5 V; Future  -     XR SHOULDER RT AP/LAT MIN 2 V; Future  -     diclofenac EC (VOLTAREN) 75 mg EC tablet; Take 1 Tablet by mouth two (2) times a day. -     cyclobenzaprine (FLEXERIL) 10 mg tablet; Take 1 Tablet by mouth three (3) times daily as needed for Muscle Spasm(s). 4. Neck pain  Comments:  degeneration seen on xray. Concern for cervical radiculopathy. NSAIDS and flexeril.   Will send to ortho if pain continues. Orders:  -     XR SPINE CERV 4 OR 5 V; Future  -     diclofenac EC (VOLTAREN) 75 mg EC tablet; Take 1 Tablet by mouth two (2) times a day. -     cyclobenzaprine (FLEXERIL) 10 mg tablet; Take 1 Tablet by mouth three (3) times daily as needed for Muscle Spasm(s).     5. Bundle branch block  -     REFERRAL TO CARDIOLOGY         Maegan Dowd, RENEE

## 2022-01-15 DIAGNOSIS — I10 ESSENTIAL HYPERTENSION: ICD-10-CM

## 2022-01-15 RX ORDER — LISINOPRIL AND HYDROCHLOROTHIAZIDE 12.5; 2 MG/1; MG/1
TABLET ORAL
Qty: 90 TABLET | Refills: 1 | Status: SHIPPED | OUTPATIENT
Start: 2022-01-15 | End: 2022-10-07

## 2022-02-22 DIAGNOSIS — E78.2 MIXED HYPERLIPIDEMIA: ICD-10-CM

## 2022-02-22 RX ORDER — ATORVASTATIN CALCIUM 20 MG/1
TABLET, FILM COATED ORAL
Qty: 90 TABLET | Refills: 1 | Status: SHIPPED | OUTPATIENT
Start: 2022-02-22 | End: 2022-09-14

## 2022-03-19 PROBLEM — U07.1 COVID-19: Status: ACTIVE | Noted: 2021-02-01

## 2022-03-31 ENCOUNTER — TELEPHONE (OUTPATIENT)
Dept: PRIMARY CARE CLINIC | Age: 57
End: 2022-03-31

## 2022-03-31 DIAGNOSIS — R09.89 FOREIGN BODY SENSATION IN THROAT: Primary | ICD-10-CM

## 2022-03-31 NOTE — TELEPHONE ENCOUNTER
Referral placed to [facility] in [facility].   Dr. Alvino Song or Dr. Adria Dupree.

## 2022-03-31 NOTE — TELEPHONE ENCOUNTER
----- Message from Ari  sent at 3/31/2022  2:24 PM EDT -----  Subject: Referral Request    QUESTIONS   Reason for referral request? Patient is requesting a referral for ENT. Patient thinks he has another polyp on his throat again, states he is   having the same symptoms- feels like there is something in his throat   constantly, and when talking a lot pt's throat starts to hurt- as last   time. States this has been going on for awhile now and would like go see   another ENT please. Has the physician seen you for this condition before? No   Preferred Specialist (if applicable)? Do you already have an appointment scheduled? No  Additional Information for Provider? Pt would like a call back regarding   this referral please, as he would like to make sure the ENT is in his   network. Thank you!   ---------------------------------------------------------------------------  --------------  CALL BACK INFO  What is the best way for the office to contact you? OK to leave message on   voicemail  Preferred Call Back Phone Number?  [phone]

## 2022-05-16 ENCOUNTER — OFFICE VISIT (OUTPATIENT)
Dept: ENT CLINIC | Age: 57
End: 2022-05-16
Payer: COMMERCIAL

## 2022-05-16 VITALS
BODY MASS INDEX: 33.18 KG/M2 | WEIGHT: 245 LBS | OXYGEN SATURATION: 98 % | SYSTOLIC BLOOD PRESSURE: 120 MMHG | HEIGHT: 72 IN | HEART RATE: 102 BPM | RESPIRATION RATE: 18 BRPM | DIASTOLIC BLOOD PRESSURE: 76 MMHG

## 2022-05-16 DIAGNOSIS — R49.0 DYSPHONIA: ICD-10-CM

## 2022-05-16 DIAGNOSIS — K21.9 LARYNGOPHARYNGEAL REFLUX (LPR): Primary | ICD-10-CM

## 2022-05-16 DIAGNOSIS — J37.0 CHRONIC LARYNGITIS: ICD-10-CM

## 2022-05-16 DIAGNOSIS — R09.89 GLOBUS PHARYNGEUS: ICD-10-CM

## 2022-05-16 PROCEDURE — 31575 DIAGNOSTIC LARYNGOSCOPY: CPT | Performed by: OTOLARYNGOLOGY

## 2022-05-16 PROCEDURE — 99204 OFFICE O/P NEW MOD 45 MIN: CPT | Performed by: OTOLARYNGOLOGY

## 2022-05-16 NOTE — PROGRESS NOTES
Subjective:    Tiffani Moss   55 y.o.   1965     New Patient Visit    Location - throat    Quality - globus, throat clearing    Severity -  moderate    Duration - few months    Timing - chronic    Context - pt with hx severe GERD, also prior smoker and prior hx of \"voice box polyp\"removed 12 year ago - presents with globus and mucus; denies sinus drainage, some throat soreness; he takes nexium BID otherwise feels nauseous; had GI eval in past but was recommended pH study and he declined    Modifying Features - worse after talking    Associated symptoms/signs - as above      Review of Systems  Review of Systems   Constitutional: Negative for chills and fever. HENT: Positive for sore throat. Negative for ear pain, hearing loss, nosebleeds and tinnitus. Eyes: Negative for blurred vision and double vision. Respiratory: Negative for cough, sputum production and shortness of breath. Cardiovascular: Negative for chest pain and palpitations. Gastrointestinal: Positive for heartburn and nausea. Negative for vomiting. Musculoskeletal: Negative for joint pain and neck pain. Skin: Negative. Neurological: Negative for dizziness, speech change, weakness and headaches. Endo/Heme/Allergies: Negative for environmental allergies. Does not bruise/bleed easily. Psychiatric/Behavioral: Negative for memory loss. The patient does not have insomnia.           Past Medical History:   Diagnosis Date    Arthritis     left and Rt knee    COVID-19 02/2021    Depression     GERD (gastroesophageal reflux disease)     Headache     Hypercholesterolemia     Hypertension     Testosterone deficiency in male      Past Surgical History:   Procedure Laterality Date    HX APPENDECTOMY      HX CYST REMOVAL Left     wrist    HX OTHER SURGICAL Left     knee surgery    HX OTHER SURGICAL      Left shoulder surgery    HX OTHER SURGICAL      vocal polyp removed    HX TONSILLECTOMY        Family History   Problem Relation Age of Onset    Diabetes Mother     Lung Disease Mother     Cancer Mother         colon    OSTEOARTHRITIS Mother     Lung Cancer Maternal Grandmother      Social History     Tobacco Use    Smoking status: Never Smoker    Smokeless tobacco: Current User    Tobacco comment: pt vape   Substance Use Topics    Alcohol use: Never      Prior to Admission medications    Medication Sig Start Date End Date Taking? Authorizing Provider   atorvastatin (LIPITOR) 20 mg tablet TAKE 1 TABLET BY MOUTH EVERY DAY 2/22/22  Yes Kinza Pizano NP   lisinopril-hydroCHLOROthiazide (PRINZIDE, ZESTORETIC) 20-12.5 mg per tablet TAKE 1 TABLET BY MOUTH EVERY DAY 1/15/22  Yes Kinza Pizano NP   albuterol (PROVENTIL HFA, VENTOLIN HFA, PROAIR HFA) 90 mcg/actuation inhaler Take 1 Puff by inhalation every four (4) hours as needed for Wheezing. 7/20/21  Yes Kinza Pizano NP   esomeprazole (NexIUM) 20 mg capsule Take 20 mg by mouth two (2) times a day. Yes Provider, Historical   diclofenac EC (VOLTAREN) 75 mg EC tablet Take 1 Tablet by mouth two (2) times a day. Patient not taking: Reported on 5/16/2022 1/5/22   Kinza Pizano NP   cyclobenzaprine (FLEXERIL) 10 mg tablet Take 1 Tablet by mouth three (3) times daily as needed for Muscle Spasm(s). Patient not taking: Reported on 5/16/2022 1/5/22   Kinza Pizano NP   ARIPiprazole (ABILIFY) 2 mg tablet Take 1 Tablet by mouth daily. Patient not taking: Reported on 5/16/2022 10/25/21   Kinza Pizano NP   testosterone cypionate (DEPOTESTOTERONE CYPIONATE) 200 mg/mL injection 0.5 mL by IntraMUSCular route Once every 2 weeks. Max Daily Amount: 100 mg. Patient not taking: Reported on 5/16/2022 9/21/21   Kinza Pizano NP   Syringe with Needle, Safety (SurGuard2 Safety) 3 mL 20 gauge x 1 1/2\" syrg 1 Syringe by Does Not Apply route Once every 2 weeks.   Patient not taking: Reported on 5/16/2022 7/20/21   Kinza Pizano NP        Allergies   Allergen Reactions    Codeine Hives    Penicillins Other (comments)         Objective:     Visit Vitals  /76 (BP 1 Location: Left upper arm, BP Patient Position: Sitting, BP Cuff Size: Adult)   Pulse (!) 102   Resp 18   Ht 5' 11.5\" (1.816 m)   Wt 245 lb (111.1 kg)   SpO2 98%   BMI 33.69 kg/m²        Physical Exam  Vitals reviewed. Constitutional:       General: He is awake. Appearance: Normal appearance. He is obese. HENT:      Head: Normocephalic and atraumatic. Jaw: There is normal jaw occlusion. No trismus, tenderness or malocclusion. Salivary Glands: Right salivary gland is not diffusely enlarged or tender. Left salivary gland is not diffusely enlarged or tender. Right Ear: Hearing, tympanic membrane, ear canal and external ear normal.      Left Ear: Hearing, tympanic membrane, ear canal and external ear normal.      Ears:      Velasco exam findings: does not lateralize. Right Rinne: AC > BC. Left Rinne: AC > BC. Nose: No septal deviation, mucosal edema or rhinorrhea. Right Turbinates: Not enlarged, swollen or pale. Left Turbinates: Not enlarged, swollen or pale. Right Sinus: No maxillary sinus tenderness or frontal sinus tenderness. Left Sinus: No maxillary sinus tenderness or frontal sinus tenderness. Mouth/Throat:      Lips: Pink. Mouth: Mucous membranes are dry. No oral lesions. Dentition: Normal dentition. No gum lesions. Tongue: No lesions. Palate: No mass and lesions. Pharynx: Oropharynx is clear. Uvula midline. Tonsils: No tonsillar exudate. 0 on the right. 0 on the left. Eyes:      General: Vision grossly intact. Extraocular Movements: Extraocular movements intact. Right eye: No nystagmus. Left eye: No nystagmus. Pupils: Pupils are equal, round, and reactive to light. Neck:      Thyroid: No thyroid mass, thyromegaly or thyroid tenderness. Trachea: Trachea and phonation normal. No tracheal tenderness.       Comments: Frequent throat clearing  Cardiovascular:      Rate and Rhythm: Normal rate and regular rhythm. Pulmonary:      Effort: Pulmonary effort is normal.      Breath sounds: Normal breath sounds. No stridor. No wheezing. Musculoskeletal:         General: Normal range of motion. Cervical back: Normal range of motion. No edema or erythema. Lymphadenopathy:      Cervical: No cervical adenopathy. Skin:     General: Skin is warm and dry. Neurological:      General: No focal deficit present. Mental Status: He is alert and oriented to person, place, and time. Mental status is at baseline. Cranial Nerves: Cranial nerves are intact. Coordination: Romberg sign negative. Gait: Gait is intact. Psychiatric:         Mood and Affect: Mood normal.         Behavior: Behavior normal. Behavior is cooperative. Procedure Note - Fiberoptic Laryngoscopy    Verbal consent is obtained. The nares are sprayed with topical lidocaine/oxymetazoline solution. After several minutes the fiberoptic scope is advanced into one or both nasal passages. Findings are as summarized. Nose - DNS right  Nasopharynx - normal eustachian tubes, no mucosal lesions  Oropharynx - normal tonsils, hypertrophy tongue base   Hypopharynx - normal pyriform sinus and post-cricoid region  Larynx - normal epiglottis, arytenoids, false cords, and mild inflammation true cords  Subglottis - visualized upper trachea is normal      Assessment/Plan:     Encounter Diagnoses   Name Primary?  Laryngopharyngeal reflux (LPR) Yes    Dysphonia     Globus pharyngeus     Chronic laryngitis      Fiberoptic exam is showing mild irritation of VFs and hypertrophy of lingual tonsillar region. I suspect this is coming from breakthrough reflux/LPR. He has no other significant findings. He is on Nexium BID and has severe sx when off meds. Needs GI referral. He will call his insurance and if needs referral will contact us for that.       Orders Placed This Encounter    Arun Bras           Thank you for referring this patient,    Javed Cr MD, [address]    [address]  [facility], [place] [id number]  [address] [facility] [place] [phone]

## 2022-05-16 NOTE — LETTER
5/16/2022    Patient: Mari Qiu   YOB: 1965   Date of Visit: 5/16/2022     Chelo Montiel NP  [place] [phone]  [address] [facility] [address]    Dear Chelo Montiel NP,      Thank you for referring Mr. Nic Belcher to [address] [place] [address] for evaluation. My notes for this consultation are attached. If you have questions, please do not hesitate to call me. I look forward to following your patient along with you.       Sincerely,    Ashley Humphrey MD

## 2022-05-18 ENCOUNTER — TELEPHONE (OUTPATIENT)
Dept: PRIMARY CARE CLINIC | Age: 57
End: 2022-05-18

## 2022-05-18 DIAGNOSIS — K21.00 GASTROESOPHAGEAL REFLUX DISEASE WITH ESOPHAGITIS WITHOUT HEMORRHAGE: Primary | ICD-10-CM

## 2022-05-18 NOTE — TELEPHONE ENCOUNTER
KS    5/18/22 8:20 AM  Note  ----- Message from Grisel Tyler sent at 5/18/2022  8:08 AM EDT -----  Subject: Referral Request     QUESTIONS   Reason for referral request? Needs a stomach doctor referral.   Has the physician seen you for this condition before? No   Preferred Specialist (if applicable)? Do you already have an appointment scheduled? Additional Information for Provider?   ---------------------------------------------------------------------------  --------------  CALL BACK INFO  What is the best way for the office to contact you? OK to leave message on   voicemail  Preferred Call Back Phone Number? [phone]  ---------------------------------------------------------------------------  --------------  SCRIPT ANSWERS  Relationship to Patient?  Self             Arsh SETHI    5/18/22 8:22 AM  Note  Left vm to get the reason for needing the referral

## 2022-05-18 NOTE — TELEPHONE ENCOUNTER
----- Message from Bakari Rubio sent at 5/18/2022  8:08 AM EDT -----  Subject: Referral Request    QUESTIONS   Reason for referral request? Needs a stomach doctor referral.   Has the physician seen you for this condition before? No   Preferred Specialist (if applicable)? Do you already have an appointment scheduled? Additional Information for Provider?   ---------------------------------------------------------------------------  --------------  CALL BACK INFO  What is the best way for the office to contact you? OK to leave message on   voicemail  Preferred Call Back Phone Number? [phone]  ---------------------------------------------------------------------------  --------------  SCRIPT ANSWERS  Relationship to Patient?  Self

## 2022-05-18 NOTE — TELEPHONE ENCOUNTER
Pt states  the ent dr told him some problems he was having with his throat was bc of his stomach acid so he needed to be seen by gastro

## 2022-09-14 ENCOUNTER — TELEPHONE (OUTPATIENT)
Dept: PRIMARY CARE CLINIC | Age: 57
End: 2022-09-14

## 2022-09-14 ENCOUNTER — OFFICE VISIT (OUTPATIENT)
Dept: PRIMARY CARE CLINIC | Age: 57
End: 2022-09-14
Payer: COMMERCIAL

## 2022-09-14 VITALS
RESPIRATION RATE: 20 BRPM | HEIGHT: 71 IN | OXYGEN SATURATION: 98 % | BODY MASS INDEX: 32.9 KG/M2 | HEART RATE: 95 BPM | DIASTOLIC BLOOD PRESSURE: 70 MMHG | SYSTOLIC BLOOD PRESSURE: 119 MMHG | TEMPERATURE: 97.3 F | WEIGHT: 235 LBS

## 2022-09-14 DIAGNOSIS — E78.2 MIXED HYPERLIPIDEMIA: ICD-10-CM

## 2022-09-14 DIAGNOSIS — R30.0 DYSURIA: Primary | ICD-10-CM

## 2022-09-14 DIAGNOSIS — M54.50 ACUTE BILATERAL LOW BACK PAIN WITHOUT SCIATICA: ICD-10-CM

## 2022-09-14 DIAGNOSIS — R33.9 URINARY RETENTION: ICD-10-CM

## 2022-09-14 LAB
BILIRUB UR QL STRIP: NEGATIVE
GLUCOSE UR-MCNC: NEGATIVE MG/DL
KETONES P FAST UR STRIP-MCNC: NEGATIVE MG/DL
PH UR STRIP: 6 [PH] (ref 4.6–8)
PROT UR QL STRIP: NEGATIVE
SP GR UR STRIP: 1.03 (ref 1–1.03)
UA UROBILINOGEN AMB POC: NORMAL (ref 0.2–1)
URINALYSIS CLARITY POC: CLEAR
URINALYSIS COLOR POC: YELLOW
URINE BLOOD POC: NEGATIVE
URINE LEUKOCYTES POC: NEGATIVE
URINE NITRITES POC: NEGATIVE

## 2022-09-14 PROCEDURE — 81003 URINALYSIS AUTO W/O SCOPE: CPT | Performed by: NURSE PRACTITIONER

## 2022-09-14 PROCEDURE — 99213 OFFICE O/P EST LOW 20 MIN: CPT | Performed by: NURSE PRACTITIONER

## 2022-09-14 RX ORDER — PHENAZOPYRIDINE HYDROCHLORIDE 200 MG/1
200 TABLET, FILM COATED ORAL
Qty: 10 TABLET | Refills: 0 | Status: SHIPPED | OUTPATIENT
Start: 2022-09-14 | End: 2022-10-18 | Stop reason: ALTCHOICE

## 2022-09-14 RX ORDER — ATORVASTATIN CALCIUM 20 MG/1
TABLET, FILM COATED ORAL
Qty: 90 TABLET | Refills: 1 | Status: SHIPPED | OUTPATIENT
Start: 2022-09-14 | End: 2022-10-18 | Stop reason: SDUPTHER

## 2022-09-14 NOTE — TELEPHONE ENCOUNTER
Pt called through [facility] and was marked as an urgent appointment. Pt stated on the phone that he is having issues with his lower back and his kidneys. Stated that it burns when he urinates.

## 2022-09-14 NOTE — PROGRESS NOTES
HISTORY OF PRESENT ILLNESS  Philip Tony is a 65 y.o. male presents for dysuria. Patient notes that he has been feeling fatigued with lower back pain x4 weeks. Started to note urinary frequency and dysuria yesterday. Told he noticed continued dysuria with some urinary hesitancy with attempting to void. No hematuria, nausea, vomiting or fevers. States overall he just has not felt well. Denies STI. Patient has been off of his testosterone treatments for quite some months. Feeling fatigued. Vitals:    09/14/22 1306   BP: 119/70   Pulse: 95   Resp: 20   Temp: 97.3 °F (36.3 °C)   TempSrc: Temporal   SpO2: 98%   Weight: 235 lb (106.6 kg)   Height: 5' 11.05\" (1.805 m)     Patient Active Problem List   Diagnosis Code    COVID-19 U07.1    Depression F32. A     Patient Active Problem List    Diagnosis Date Noted    Depression     COVID-19 02/2021     Current Outpatient Medications   Medication Sig Dispense Refill    atorvastatin (LIPITOR) 20 mg tablet TAKE 1 TABLET BY MOUTH EVERY DAY 90 Tablet 1    phenazopyridine (PYRIDIUM) 200 mg tablet Take 1 Tablet by mouth three (3) times daily as needed for Pain. 10 Tablet 0    lisinopril-hydroCHLOROthiazide (PRINZIDE, ZESTORETIC) 20-12.5 mg per tablet TAKE 1 TABLET BY MOUTH EVERY DAY 90 Tablet 1    esomeprazole (NEXIUM) 20 mg capsule Take 20 mg by mouth two (2) times a day. testosterone cypionate (DEPOTESTOTERONE CYPIONATE) 200 mg/mL injection 0.5 mL by IntraMUSCular route Once every 2 weeks. Max Daily Amount: 100 mg.  (Patient not taking: No sig reported) 2 mL 5     Allergies   Allergen Reactions    Codeine Hives    Penicillins Other (comments)     Past Medical History:   Diagnosis Date    Arthritis     left and Rt knee    COVID-19 02/2021    Depression     GERD (gastroesophageal reflux disease)     Headache     Hypercholesterolemia     Hypertension     Testosterone deficiency in male      Past Surgical History:   Procedure Laterality Date    HX APPENDECTOMY HX COLONOSCOPY  09/2022    HX CYST REMOVAL Left     wrist    HX OTHER SURGICAL Left     knee surgery    HX OTHER SURGICAL      Left shoulder surgery    HX OTHER SURGICAL      vocal polyp removed    HX TONSILLECTOMY       Family History   Problem Relation Age of Onset    Diabetes Mother     Lung Disease Mother     Cancer Mother         colon    OSTEOARTHRITIS Mother     Lung Cancer Maternal Grandmother      Social History     Tobacco Use    Smoking status: Never    Smokeless tobacco: Current    Tobacco comments:     pt vape   Substance Use Topics    Alcohol use: Never           Review of Systems   Constitutional:  Positive for malaise/fatigue. Negative for chills and fever. Gastrointestinal:  Negative for abdominal pain, nausea and vomiting. Genitourinary:  Positive for dysuria and frequency. Negative for flank pain, hematuria and urgency. Musculoskeletal:  Positive for back pain and myalgias. Skin:  Negative for itching and rash. Physical Exam  Constitutional:       Appearance: Normal appearance. HENT:      Head: Normocephalic and atraumatic. Eyes:      Extraocular Movements: Extraocular movements intact. Conjunctiva/sclera: Conjunctivae normal.      Pupils: Pupils are equal, round, and reactive to light. Cardiovascular:      Rate and Rhythm: Normal rate and regular rhythm. Pulses: Normal pulses. Pulmonary:      Effort: Pulmonary effort is normal.      Breath sounds: Normal breath sounds. Abdominal:      Tenderness: There is no right CVA tenderness or left CVA tenderness. Musculoskeletal:         General: Normal range of motion. Lumbar back: Tenderness present. Back:    Skin:     General: Skin is warm and dry. Neurological:      General: No focal deficit present. Mental Status: He is alert and oriented to person, place, and time.    Psychiatric:         Mood and Affect: Mood normal.         Behavior: Behavior normal.         ASSESSMENT and PLAN  Diagnoses and all orders for this visit:    1. Dysuria  Comments:  UA shows high specific gravity but no sign of infection. checking labs and culture. pyridium for symptoms. Orders:  -     CBC WITH AUTOMATED DIFF  -     METABOLIC PANEL, COMPREHENSIVE  -     CULTURE, URINE  -     AMB POC URINALYSIS DIP STICK AUTO W/O MICRO  -     phenazopyridine (PYRIDIUM) 200 mg tablet; Take 1 Tablet by mouth three (3) times daily as needed for Pain. 2. Acute bilateral low back pain without sciatica  Comments:  most likely musculoskeletal in nature. will check labs. 3. Urinary retention  Comments:  check PSA.    Orders:  -     PSA W/ REFLX FREE PSA       León Stovall, NP

## 2022-09-14 NOTE — PROGRESS NOTES
1. \"Have you been to the ER, urgent care clinic since your last visit? Hospitalized since your last visit? \" No    2. \"Have you seen or consulted any other health care providers outside of the [address] [place] [address] since your last visit? \" No     3. For patients aged 39-70: Has the patient had a colonoscopy / FIT/ Cologuard? Yes - Care Gap present. Most recent result on file      If the patient is female:    4. For patients aged 41-77: Has the patient had a mammogram within the past 2 years? NA - based on age or sex      11. For patients aged 21-65: Has the patient had a pap smear?  NA - based on age or sex  Visit Vitals  /70 (BP 1 Location: Left upper arm, BP Patient Position: Sitting, BP Cuff Size: Large adult)   Pulse 95   Temp 97.3 °F (36.3 °C) (Temporal)   Resp 20   Ht 5' 11.05\" (1.805 m)   Wt 235 lb (106.6 kg)   SpO2 98%   BMI 32.73 kg/m²     Chief Complaint   Patient presents with    Urinary Frequency     Increase frequency and burning during urination

## 2022-09-18 LAB
ALBUMIN SERPL-MCNC: 4.4 G/DL (ref 3.8–4.9)
ALBUMIN/GLOB SERPL: 2.1 {RATIO} (ref 1.2–2.2)
ALP SERPL-CCNC: 73 IU/L (ref 44–121)
ALT SERPL-CCNC: 38 IU/L (ref 0–44)
AST SERPL-CCNC: 17 IU/L (ref 0–40)
BACTERIA UR CULT: NO GROWTH
BASOPHILS # BLD AUTO: 0.1 X10E3/UL (ref 0–0.2)
BASOPHILS NFR BLD AUTO: 1 %
BILIRUB SERPL-MCNC: 0.4 MG/DL (ref 0–1.2)
BUN SERPL-MCNC: 16 MG/DL (ref 6–24)
BUN/CREAT SERPL: 23 (ref 9–20)
CALCIUM SERPL-MCNC: 9.7 MG/DL (ref 8.7–10.2)
CHLORIDE SERPL-SCNC: 100 MMOL/L (ref 96–106)
CO2 SERPL-SCNC: 19 MMOL/L (ref 20–29)
CREAT SERPL-MCNC: 0.71 MG/DL (ref 0.76–1.27)
EGFR: 108 ML/MIN/1.73
EOSINOPHIL # BLD AUTO: 0.2 X10E3/UL (ref 0–0.4)
EOSINOPHIL NFR BLD AUTO: 2 %
ERYTHROCYTE [DISTWIDTH] IN BLOOD BY AUTOMATED COUNT: 12.5 % (ref 11.6–15.4)
GLOBULIN SER CALC-MCNC: 2.1 G/DL (ref 1.5–4.5)
GLUCOSE SERPL-MCNC: 119 MG/DL (ref 65–99)
HCT VFR BLD AUTO: 45.7 % (ref 37.5–51)
HGB BLD-MCNC: 15.9 G/DL (ref 13–17.7)
IMM GRANULOCYTES # BLD AUTO: 0.1 X10E3/UL (ref 0–0.1)
IMM GRANULOCYTES NFR BLD AUTO: 1 %
LYMPHOCYTES # BLD AUTO: 2 X10E3/UL (ref 0.7–3.1)
LYMPHOCYTES NFR BLD AUTO: 18 %
MCH RBC QN AUTO: 32.1 PG (ref 26.6–33)
MCHC RBC AUTO-ENTMCNC: 34.8 G/DL (ref 31.5–35.7)
MCV RBC AUTO: 92 FL (ref 79–97)
MONOCYTES # BLD AUTO: 0.8 X10E3/UL (ref 0.1–0.9)
MONOCYTES NFR BLD AUTO: 7 %
NEUTROPHILS # BLD AUTO: 7.9 X10E3/UL (ref 1.4–7)
NEUTROPHILS NFR BLD AUTO: 71 %
PLATELET # BLD AUTO: 279 X10E3/UL (ref 150–450)
POTASSIUM SERPL-SCNC: 4.1 MMOL/L (ref 3.5–5.2)
PROT SERPL-MCNC: 6.5 G/DL (ref 6–8.5)
PSA SERPL-MCNC: 1.6 NG/ML (ref 0–4)
RBC # BLD AUTO: 4.96 X10E6/UL (ref 4.14–5.8)
REFLEX CRITERIA: NORMAL
SODIUM SERPL-SCNC: 140 MMOL/L (ref 134–144)
WBC # BLD AUTO: 11.1 X10E3/UL (ref 3.4–10.8)

## 2022-09-19 NOTE — PROGRESS NOTES
Please call and let him know that his urine culture did not grow bacteria and his PSA and kidney function tests are normal.     How is he feeling?

## 2022-09-28 DIAGNOSIS — J40 BRONCHITIS: ICD-10-CM

## 2022-09-28 RX ORDER — ALBUTEROL SULFATE 90 UG/1
1 AEROSOL, METERED RESPIRATORY (INHALATION)
Qty: 6.7 EACH | Refills: 2 | Status: SHIPPED | OUTPATIENT
Start: 2022-09-28

## 2022-10-07 DIAGNOSIS — I10 ESSENTIAL HYPERTENSION: ICD-10-CM

## 2022-10-07 RX ORDER — LISINOPRIL AND HYDROCHLOROTHIAZIDE 12.5; 2 MG/1; MG/1
TABLET ORAL
Qty: 90 TABLET | Refills: 1 | Status: SHIPPED | OUTPATIENT
Start: 2022-10-07 | End: 2022-10-18 | Stop reason: SDUPTHER

## 2022-10-18 ENCOUNTER — OFFICE VISIT (OUTPATIENT)
Dept: PRIMARY CARE CLINIC | Age: 57
End: 2022-10-18
Payer: COMMERCIAL

## 2022-10-18 VITALS
HEART RATE: 81 BPM | HEIGHT: 71 IN | WEIGHT: 235.4 LBS | BODY MASS INDEX: 32.96 KG/M2 | OXYGEN SATURATION: 98 % | TEMPERATURE: 98 F | DIASTOLIC BLOOD PRESSURE: 74 MMHG | SYSTOLIC BLOOD PRESSURE: 121 MMHG

## 2022-10-18 DIAGNOSIS — I10 ESSENTIAL HYPERTENSION: ICD-10-CM

## 2022-10-18 DIAGNOSIS — E78.2 MIXED HYPERLIPIDEMIA: Primary | ICD-10-CM

## 2022-10-18 DIAGNOSIS — L98.9 SKIN LESION OF FACE: ICD-10-CM

## 2022-10-18 DIAGNOSIS — Z87.891 PERSONAL HISTORY OF TOBACCO USE, PRESENTING HAZARDS TO HEALTH: ICD-10-CM

## 2022-10-18 DIAGNOSIS — M54.41 ACUTE RIGHT-SIDED LOW BACK PAIN WITH RIGHT-SIDED SCIATICA: ICD-10-CM

## 2022-10-18 DIAGNOSIS — Z11.59 NEED FOR HEPATITIS C SCREENING TEST: ICD-10-CM

## 2022-10-18 DIAGNOSIS — F17.200 TOBACCO DEPENDENCY: ICD-10-CM

## 2022-10-18 PROCEDURE — 99214 OFFICE O/P EST MOD 30 MIN: CPT | Performed by: NURSE PRACTITIONER

## 2022-10-18 RX ORDER — SODIUM PICOSULFATE, MAGNESIUM OXIDE, AND ANHYDROUS CITRIC ACID 10; 3.5; 12 MG/160ML; G/160ML; G/160ML
LIQUID ORAL AS DIRECTED
COMMUNITY
Start: 2022-09-05

## 2022-10-18 RX ORDER — OMEPRAZOLE 20 MG/1
CAPSULE, DELAYED RELEASE ORAL
COMMUNITY
Start: 2022-09-13

## 2022-10-18 RX ORDER — CYCLOBENZAPRINE HCL 10 MG
10 TABLET ORAL
Qty: 10 TABLET | Refills: 0 | Status: SHIPPED | OUTPATIENT
Start: 2022-10-18

## 2022-10-18 RX ORDER — ATORVASTATIN CALCIUM 20 MG/1
20 TABLET, FILM COATED ORAL DAILY
Qty: 90 TABLET | Refills: 2 | Status: SHIPPED | OUTPATIENT
Start: 2022-10-18

## 2022-10-18 RX ORDER — LISINOPRIL AND HYDROCHLOROTHIAZIDE 12.5; 2 MG/1; MG/1
1 TABLET ORAL DAILY
Qty: 90 TABLET | Refills: 2 | Status: SHIPPED | OUTPATIENT
Start: 2022-10-18

## 2022-10-18 RX ORDER — PREDNISONE 20 MG/1
TABLET ORAL
Qty: 12 TABLET | Refills: 0 | Status: SHIPPED | OUTPATIENT
Start: 2022-10-18 | End: 2022-10-24

## 2022-10-18 NOTE — PROGRESS NOTES
HISTORY OF PRESENT ILLNESS  Dianna Mancia is a 65 y.o. male presents for follow up on chronic conditions and back pain. Hypertension: Patients hypertension is well controlled on regimen of lisinopril-HCTZ. Denies headaches, blurred vision or dizziness. Hyperlipidemia: Mixed hyperlipidemia well controlled on atorvastatin. Denies any leg cramps or malaise from this medication. Reported compliance with taking medication daily. Back Pain:  Patient c/o lower pain back that radiates into his hips and down his right leg x2 months. Patient denies injury. Patient reported that he feels like his bed is playing a role in the pain. Patient denies urinary symptoms. Lesion:  Patient c/o lesion to right side of face that has been there for a year. Patient reported it scabs up and then scab comes off, does not heal.     Vitals:    10/18/22 0710   BP: 121/74   Pulse: 81   Temp: 98 °F (36.7 °C)   TempSrc: Oral   SpO2: 98%   Weight: 235 lb 6.4 oz (106.8 kg)   Height: 5' 11\" (1.803 m)     Patient Active Problem List   Diagnosis Code    COVID-19 U07.1    Depression F32. A     Patient Active Problem List    Diagnosis Date Noted    Depression     COVID-19 02/2021     Current Outpatient Medications   Medication Sig Dispense Refill    atorvastatin (LIPITOR) 20 mg tablet Take 1 Tablet by mouth daily. 90 Tablet 2    lisinopril-hydroCHLOROthiazide (PRINZIDE, ZESTORETIC) 20-12.5 mg per tablet Take 1 Tablet by mouth daily. 90 Tablet 2    cyclobenzaprine (FLEXERIL) 10 mg tablet Take 1 Tablet by mouth three (3) times daily as needed for Muscle Spasm(s). 10 Tablet 0    predniSONE (DELTASONE) 20 mg tablet Take 3 Tablets by mouth daily for 2 days, THEN 2 Tablets daily for 2 days, THEN 1 Tablet daily for 2 days. 12 Tablet 0    albuterol (PROVENTIL HFA, VENTOLIN HFA, PROAIR HFA) 90 mcg/actuation inhaler TAKE 1 PUFF BY INHALATION EVERY FOUR (4) HOURS AS NEEDED FOR WHEEZING.  6.7 Each 2    omeprazole (PRILOSEC) 20 mg capsule TAKE 1 CAPSULE BY MOUTH TWICE A DAY BEFORE MEALS FOR 30 DAYS      Clenpiq 10 mg-3.5 gram -12 gram/160 mL soln as directed. Allergies   Allergen Reactions    Codeine Hives    Penicillins Other (comments)     Past Medical History:   Diagnosis Date    Arthritis     left and Rt knee    COVID-19 2021    Depression     GERD (gastroesophageal reflux disease)     Headache     Hypercholesterolemia     Hypertension     Testosterone deficiency in male      Past Surgical History:   Procedure Laterality Date    HX APPENDECTOMY      HX COLONOSCOPY  2022    HX CYST REMOVAL Left     wrist    HX OTHER SURGICAL Left     knee surgery    HX OTHER SURGICAL      Left shoulder surgery    HX OTHER SURGICAL      vocal polyp removed    HX TONSILLECTOMY       Family History   Problem Relation Age of Onset    Diabetes Mother     Lung Disease Mother     Cancer Mother         colon    OSTEOARTHRITIS Mother     Lung Cancer Maternal Grandmother      Social History     Tobacco Use    Smoking status: Former     Packs/day: 1.00     Years: 40.00     Pack years: 40.00     Types: Cigarettes, Cigars     Start date: 37     Quit date:      Years since quittin.8    Smokeless tobacco: Current    Tobacco comments:     pt vape   Substance Use Topics    Alcohol use: Never           Review of Systems   Constitutional:  Negative for malaise/fatigue and weight loss. Eyes:  Negative for blurred vision and double vision. Respiratory:  Negative for shortness of breath. Cardiovascular:  Negative for chest pain and palpitations. Musculoskeletal:  Positive for back pain. Neurological:  Negative for dizziness, tingling, tremors and headaches. Psychiatric/Behavioral:  The patient is not nervous/anxious and does not have insomnia. Physical Exam  Constitutional:       Appearance: Normal appearance. He is obese. HENT:      Head: Normocephalic and atraumatic. Eyes:      Extraocular Movements: Extraocular movements intact. Conjunctiva/sclera: Conjunctivae normal.      Pupils: Pupils are equal, round, and reactive to light. Cardiovascular:      Rate and Rhythm: Normal rate and regular rhythm. Pulses: Normal pulses. Pulmonary:      Effort: Pulmonary effort is normal.      Breath sounds: Normal breath sounds. Musculoskeletal:         General: Normal range of motion. Lumbar back: Tenderness (to paraspinal muscles) present. No bony tenderness. Normal range of motion. Skin:     General: Skin is warm and dry. Neurological:      General: No focal deficit present. Mental Status: He is alert and oriented to person, place, and time. Psychiatric:         Mood and Affect: Mood normal.         Behavior: Behavior normal.       Discussed with the patient the current USPSTF guidelines released March 9, 2021 for screening for lung cancer. For adults aged 48 to [de-identified] years who have a 20 pack-year smoking history and currently smoke or have quit within the past 15 years the grade B recommendation is to:  Screen for lung cancer with low-dose computed tomography (LDCT) every year. Stop screening once a person has not smoked for 15 years or has a health problem that limits life expectancy or the ability to have lung surgery. The patient has a 40 pack-year history of cigarette smoking and currently is using cigars but not cigarettes. Discussed with patient the risks and benefits of screening, including over-diagnosis, false positive rate, and total radiation exposure. The patient currently exhibits no signs or symptoms suggestive of lung cancer. Discussed with patient the importance of compliance with yearly annual lung cancer screenings and willingness to undergo diagnosis and treatment if screening scan is positive. In addition, the patient was counseled regarding the importance of remaining smoke free and/or total smoking cessation.     Also reviewed the following if the patient has Medicare that as of February 10, 2022, Medicare only covers LDCT screening in patients aged 51-72 with at least a 20 pack-year smoking history who currently smoke or have quit in the last 15 years      ASSESSMENT and PLAN  Diagnoses and all orders for this visit:    1. Mixed hyperlipidemia  Comments:  check labs. Orders:  -     atorvastatin (LIPITOR) 20 mg tablet; Take 1 Tablet by mouth daily.  -     METABOLIC PANEL, COMPREHENSIVE  -     LIPID PANEL    2. Skin lesion of face  Comments:  referral to derm. Orders:  -     REFERRAL TO DERMATOLOGY    3. Tobacco dependency    4. Personal history of tobacco use, presenting hazards to health  -     CT LOW DOSE LUNG CANCER SCREENING; Future    5. Essential hypertension  Comments:  stable on current medication. Orders:  -     lisinopril-hydroCHLOROthiazide (PRINZIDE, ZESTORETIC) 20-12.5 mg per tablet; Take 1 Tablet by mouth daily.  -     METABOLIC PANEL, COMPREHENSIVE  -     CBC WITH AUTOMATED DIFF  -     LIPID PANEL    6. Need for hepatitis C screening test  -     HEPATITIS C AB    7. Acute right-sided low back pain with right-sided sciatica  Comments:  treat with steroids, muscle relaxants, heat and stretching. Orders:  -     cyclobenzaprine (FLEXERIL) 10 mg tablet; Take 1 Tablet by mouth three (3) times daily as needed for Muscle Spasm(s). -     predniSONE (DELTASONE) 20 mg tablet; Take 3 Tablets by mouth daily for 2 days, THEN 2 Tablets daily for 2 days, THEN 1 Tablet daily for 2 days.        Rachel Mcneill NP

## 2022-10-18 NOTE — PROGRESS NOTES
Chief Complaint   Patient presents with    Follow Up Chronic Condition    Labs       Visit Vitals  /74 (BP 1 Location: Left upper arm, BP Patient Position: Sitting, BP Cuff Size: Adult)   Pulse 81   Temp 98 °F (36.7 °C) (Oral)   Ht 5' 11\" (1.803 m)   Wt 235 lb 6.4 oz (106.8 kg)   SpO2 98%   BMI 32.83 kg/m²       1. Have you been to the ER, urgent care clinic since your last visit? Hospitalized since your last visit? no    2. Have you seen or consulted any other health care providers outside of the [address] [place] [address] since your last visit? Include any pap smears or colon screening.   no

## 2022-10-19 LAB
ALBUMIN SERPL-MCNC: 4.7 G/DL (ref 3.8–4.9)
ALBUMIN/GLOB SERPL: 2 {RATIO} (ref 1.2–2.2)
ALP SERPL-CCNC: 78 IU/L (ref 44–121)
ALT SERPL-CCNC: 35 IU/L (ref 0–44)
AST SERPL-CCNC: 19 IU/L (ref 0–40)
BASOPHILS # BLD AUTO: 0.1 X10E3/UL (ref 0–0.2)
BASOPHILS NFR BLD AUTO: 1 %
BILIRUB SERPL-MCNC: 0.5 MG/DL (ref 0–1.2)
BUN SERPL-MCNC: 14 MG/DL (ref 6–24)
BUN/CREAT SERPL: 22 (ref 9–20)
CALCIUM SERPL-MCNC: 9.8 MG/DL (ref 8.7–10.2)
CHLORIDE SERPL-SCNC: 104 MMOL/L (ref 96–106)
CHOLEST SERPL-MCNC: 154 MG/DL (ref 100–199)
CO2 SERPL-SCNC: 18 MMOL/L (ref 20–29)
CREAT SERPL-MCNC: 0.64 MG/DL (ref 0.76–1.27)
EGFR: 111 ML/MIN/1.73
EOSINOPHIL # BLD AUTO: 0.1 X10E3/UL (ref 0–0.4)
EOSINOPHIL NFR BLD AUTO: 1 %
ERYTHROCYTE [DISTWIDTH] IN BLOOD BY AUTOMATED COUNT: 12.5 % (ref 11.6–15.4)
GLOBULIN SER CALC-MCNC: 2.4 G/DL (ref 1.5–4.5)
GLUCOSE SERPL-MCNC: 108 MG/DL (ref 70–99)
HCT VFR BLD AUTO: 47.8 % (ref 37.5–51)
HCV AB S/CO SERPL IA: <0.1 S/CO RATIO (ref 0–0.9)
HDLC SERPL-MCNC: 37 MG/DL
HGB BLD-MCNC: 16.1 G/DL (ref 13–17.7)
IMM GRANULOCYTES # BLD AUTO: 0.1 X10E3/UL (ref 0–0.1)
IMM GRANULOCYTES NFR BLD AUTO: 1 %
LDLC SERPL CALC-MCNC: 99 MG/DL (ref 0–99)
LYMPHOCYTES # BLD AUTO: 1.7 X10E3/UL (ref 0.7–3.1)
LYMPHOCYTES NFR BLD AUTO: 18 %
MCH RBC QN AUTO: 31.3 PG (ref 26.6–33)
MCHC RBC AUTO-ENTMCNC: 33.7 G/DL (ref 31.5–35.7)
MCV RBC AUTO: 93 FL (ref 79–97)
MONOCYTES # BLD AUTO: 0.8 X10E3/UL (ref 0.1–0.9)
MONOCYTES NFR BLD AUTO: 8 %
NEUTROPHILS # BLD AUTO: 6.9 X10E3/UL (ref 1.4–7)
NEUTROPHILS NFR BLD AUTO: 71 %
PLATELET # BLD AUTO: 290 X10E3/UL (ref 150–450)
POTASSIUM SERPL-SCNC: 4.4 MMOL/L (ref 3.5–5.2)
PROT SERPL-MCNC: 7.1 G/DL (ref 6–8.5)
RBC # BLD AUTO: 5.15 X10E6/UL (ref 4.14–5.8)
SODIUM SERPL-SCNC: 138 MMOL/L (ref 134–144)
TRIGL SERPL-MCNC: 97 MG/DL (ref 0–149)
VLDLC SERPL CALC-MCNC: 18 MG/DL (ref 5–40)
WBC # BLD AUTO: 9.6 X10E3/UL (ref 3.4–10.8)

## 2022-10-27 ENCOUNTER — TELEPHONE (OUTPATIENT)
Dept: PRIMARY CARE CLINIC | Age: 57
End: 2022-10-27

## 2022-10-27 NOTE — TELEPHONE ENCOUNTER
----- Message from Aubree Vuong sent at 10/26/2022  3:47 PM EDT -----  Subject: Message to Provider    QUESTIONS  Information for Provider? Patient got a referral for a skin specialist and   was wanting to know what the address is because he is scheduled and has an   appointment on 11/01/2022. Please call patient back to provide the address   for his referral.   ---------------------------------------------------------------------------  --------------  [id number]  [phone]; OK to leave message on voicemail, OK to respond with   electronic message via [employer] portal (only for patients who have   registered [employer] account)  ---------------------------------------------------------------------------  --------------  SCRIPT ANSWERS  Relationship to Patient?  Self

## 2022-10-31 ENCOUNTER — TELEPHONE (OUTPATIENT)
Dept: PRIMARY CARE CLINIC | Age: 57
End: 2022-10-31

## 2022-10-31 ENCOUNTER — HOSPITAL ENCOUNTER (OUTPATIENT)
Dept: CT IMAGING | Age: 57
Discharge: HOME | End: 2022-10-31
Payer: COMMERCIAL

## 2022-10-31 DIAGNOSIS — Z87.891 PERSONAL HISTORY OF TOBACCO USE, PRESENTING HAZARDS TO HEALTH: ICD-10-CM

## 2022-10-31 PROCEDURE — 71271 CT THORAX LUNG CANCER SCR C-: CPT

## 2022-10-31 NOTE — PROGRESS NOTES
CT scan showed 2 small nodules. Recommendation is to follow up in 1 year for repeat scan to monitor.

## 2022-10-31 NOTE — TELEPHONE ENCOUNTER
Pt said he wants to speak with you asap he said he had imaging done and has two nodules I told him id let you know said \"it is only a 3 minute phone call\"

## 2022-12-12 ENCOUNTER — NURSE TRIAGE (OUTPATIENT)
Dept: OTHER | Facility: CLINIC | Age: 57
End: 2022-12-12

## 2022-12-12 NOTE — TELEPHONE ENCOUNTER
[place]    Received call from Luca Dobbs at [facility] with Red Flag Complaint. Subjective: Caller states \"I was on testosterone therapy but I just stopped taking it, ever since I have slowly declined\"     Current Symptoms: fatigue, body aches    Onset: 6 months ago; worsening    Associated Symptoms: reduced activity, increased wakefulness    Pain Severity: 6/10; aching; constant    Temperature: no     What has been tried: wanting to restart testosterone     LMP: NA Pregnant: NA    Recommended disposition: See PCP within 24 Hours    Care advice provided, patient verbalizes understanding; denies any other questions or concerns; instructed to call back for any new or worsening symptoms. Patient/Caller agrees with recommended disposition; writer provided warm transfer to [facility] at [facility] for appointment scheduling    Attention Provider: Thank you for allowing me to participate in the care of your patient. The patient was connected to triage in response to information provided to the E[facility]. Please do not respond through this encounter as the response is not directed to a shared pool.       Reason for Disposition   [1] MODERATE weakness (i.e., interferes with work, school, normal activities) AND [2] persists > 3 days    Protocols used: Weakness (Generalized) and Fatigue-ADULT-

## 2022-12-15 ENCOUNTER — VIRTUAL VISIT (OUTPATIENT)
Dept: PRIMARY CARE CLINIC | Age: 57
End: 2022-12-15
Payer: COMMERCIAL

## 2022-12-15 DIAGNOSIS — E29.1 HYPOGONADISM MALE: Primary | ICD-10-CM

## 2022-12-15 PROCEDURE — 99213 OFFICE O/P EST LOW 20 MIN: CPT | Performed by: NURSE PRACTITIONER

## 2022-12-15 RX ORDER — TESTOSTERONE CYPIONATE 100 MG/ML
100 INJECTION, SOLUTION INTRAMUSCULAR EVERY 2 WEEKS
Qty: 2 ML | Refills: 4
Start: 2022-12-15

## 2022-12-15 NOTE — PATIENT INSTRUCTIONS
Chief Complaint   Patient presents with    Other     Would like to restart Testerone      . vs  1. \"Have you been to the ER, urgent care clinic since your last visit? Hospitalized since your last visit? \" Yes When: 11/2022    2. \"Have you seen or consulted any other health care providers outside of the [address] [place] [address] since your last visit? \"  Yes      3. For patients aged 39-70: Has the patient had a colonoscopy / FIT/ Cologuard? Yes - no Care Gap present      If the patient is female:    4. For patients aged 41-77: Has the patient had a mammogram within the past 2 years? NA - based on age or sex      11. For patients aged 21-65: Has the patient had a pap smear?  NA - based on age or sex

## 2022-12-15 NOTE — PROGRESS NOTES
HISTORY OF PRESENT ILLNESS  Alyssa Guzman is a 65 y.o. male presents via telemedicine for follow up on low testosterone. Patient has history of low testosterone for which he historically was on testosterone injections for. Had tried androgel but this did not work well for patient. He has been off of testosterone for about a year after having some mood changes, specifically anger, on the medication. He however would like to discuss restarting as he is having fatigue, low libido, joint pain and depression since being off of the testosterone. He did see an improvement in these symptoms while getting injections once every 2 weeks which were done at home by his wife. Labs from 7/20/21 show testosterone of 154 and labs from 11/17/20 shows testosterone at 238. Will assess labs again now prior to restarting. There were no vitals filed for this visit. Patient Active Problem List   Diagnosis Code    COVID-19 U07.1    Depression F32. A     Patient Active Problem List    Diagnosis Date Noted    Depression     COVID-19 02/2021     Current Outpatient Medications   Medication Sig Dispense Refill    atorvastatin (LIPITOR) 20 mg tablet Take 1 Tablet by mouth daily. 90 Tablet 2    lisinopril-hydroCHLOROthiazide (PRINZIDE, ZESTORETIC) 20-12.5 mg per tablet Take 1 Tablet by mouth daily. 90 Tablet 2    testosterone cypionate (DEPO-TESTOSTERONE) 100 mg/mL injection 100 mg by IntraMUSCular route Once every 2 weeks. Max Daily Amount: 100 mg. 2 mL 4    omeprazole (PRILOSEC) 20 mg capsule TAKE 1 CAPSULE BY MOUTH TWICE A DAY BEFORE MEALS FOR 30 DAYS (Patient not taking: Reported on 12/15/2022)      Clenpiq 10 mg-3.5 gram -12 gram/160 mL soln as directed. (Patient not taking: Reported on 12/15/2022)      cyclobenzaprine (FLEXERIL) 10 mg tablet Take 1 Tablet by mouth three (3) times daily as needed for Muscle Spasm(s).  (Patient not taking: Reported on 12/15/2022) 10 Tablet 0    albuterol (PROVENTIL HFA, VENTOLIN HFA, PROAIR HFA) 90 mcg/actuation inhaler TAKE 1 PUFF BY INHALATION EVERY FOUR (4) HOURS AS NEEDED FOR WHEEZING. (Patient not taking: Reported on 12/15/2022) 6.7 Each 2     Allergies   Allergen Reactions    Codeine Hives    Penicillins Other (comments)     Past Medical History:   Diagnosis Date    Arthritis     left and Rt knee    COVID-19 2021    Depression     GERD (gastroesophageal reflux disease)     Headache     Hypercholesterolemia     Hypertension     Lumbar herniated disc 2021    Testosterone deficiency in male      Past Surgical History:   Procedure Laterality Date    HX APPENDECTOMY      HX COLONOSCOPY  2022    HX CYST REMOVAL Left     wrist    HX OTHER SURGICAL Left     knee surgery    HX OTHER SURGICAL      Left shoulder surgery    HX OTHER SURGICAL      vocal polyp removed    HX TONSILLECTOMY       Family History   Problem Relation Age of Onset    Diabetes Mother     Lung Disease Mother     Cancer Mother         colon    OSTEOARTHRITIS Mother     Lung Cancer Maternal Grandmother      Social History     Tobacco Use    Smoking status: Former     Packs/day: 1.00     Years: 40.00     Pack years: 40.00     Types: Cigarettes, Cigars     Start date: 37     Quit date:      Years since quittin.9    Smokeless tobacco: Current    Tobacco comments:     pt vape   Substance Use Topics    Alcohol use: Never           Review of Systems   Constitutional:  Positive for malaise/fatigue. Genitourinary: Negative. Musculoskeletal:  Positive for joint pain. Neurological:  Positive for headaches. Psychiatric/Behavioral:  Positive for depression. Physical Exam  Constitutional:       Appearance: Normal appearance. HENT:      Head: Normocephalic. Eyes:      Conjunctiva/sclera: Conjunctivae normal.   Pulmonary:      Effort: Pulmonary effort is normal.   Skin:     General: Skin is dry. Neurological:      Mental Status: He is alert and oriented to person, place, and time.    Psychiatric:         Mood and Affect: Mood normal.         Behavior: Behavior normal.         ASSESSMENT and PLAN  Diagnoses and all orders for this visit:    1. Hypogonadism male  Comments:  will check testosterone tomorrow AM.   restart at 100 mg every 2 weeks with close monitoring. Orders:  -     TESTOSTERONE, FREE & TOTAL  -     testosterone cypionate (DEPO-TESTOSTERONE) 100 mg/mL injection; 100 mg by IntraMUSCular route Once every 2 weeks. Max Daily Amount: 100 mg.       Amanda Santana, who was evaluated through a synchronous (real-time) audio-video encounter, and/or his healthcare decision maker, is aware that it is a billable service, with coverage as determined by his insurance carrier. He provided verbal consent to proceed: Yes, and patient identification was verified. It was conducted pursuant to the emergency declaration under the [address] [place] [address] and the [employer] Act. A caregiver was present when appropriate. Ability to conduct physical exam was limited. I was at home. The patient was at home. Amanda Santana is a 65 y.o. male being evaluated by a Virtual Visit (video visit) encounter to address concerns as mentioned above. A caregiver was present when appropriate. Due to this being a TeleHealth encounter (During IYKIA-10 public health emergency), evaluation of the following organ systems was limited: Vitals/Constitutional/EENT/Resp/CV/GI//MS/Neuro/Skin/Heme-Lymph-Imm. Pursuant to the emergency declaration under the [address] [place] [address] and the [employer] and [employer] Act, this Virtual Visit was conducted with patient's (and/or legal guardian's) consent, to reduce the risk of exposure to COVID-19 and provide necessary medical care.       Services were provided through a video synchronous discussion virtually to substitute for in-person encounter. --Georgina Sandoval NP on 12/15/2022 at 2:50 PM    An electronic signature was used to authenticate this note.

## 2022-12-18 LAB
TESTOST FREE SERPL-MCNC: 6.6 PG/ML (ref 7.2–24)
TESTOST SERPL-MCNC: 237 NG/DL (ref 264–916)

## 2023-01-06 ENCOUNTER — TELEPHONE (OUTPATIENT)
Dept: PRIMARY CARE CLINIC | Age: 58
End: 2023-01-06

## 2023-01-06 DIAGNOSIS — E29.1 HYPOGONADISM MALE: ICD-10-CM

## 2023-01-06 NOTE — TELEPHONE ENCOUNTER
Pt called and asked if testosterone cypionate can be transferred to new pharmacy listed in encounter.

## 2023-01-11 RX ORDER — TESTOSTERONE CYPIONATE 100 MG/ML
100 INJECTION, SOLUTION INTRAMUSCULAR EVERY 2 WEEKS
Qty: 2 ML | Refills: 1 | Status: SHIPPED | OUTPATIENT
Start: 2023-01-11

## 2023-01-12 ENCOUNTER — TELEPHONE (OUTPATIENT)
Dept: PRIMARY CARE CLINIC | Age: 58
End: 2023-01-12

## 2023-01-12 DIAGNOSIS — E29.1 HYPOGONADISM IN MALE: Primary | ICD-10-CM

## 2023-01-12 RX ORDER — TESTOSTERONE CYPIONATE 200 MG/ML
100 INJECTION, SOLUTION INTRAMUSCULAR EVERY 2 WEEKS
Qty: 5 ML | Refills: 0 | Status: SHIPPED | OUTPATIENT
Start: 2023-01-12

## 2023-01-12 NOTE — TELEPHONE ENCOUNTER
Pt says his testosterone was sent into the pharmacy but the pharmacy is saying they sent over a request to change from 100 mg to 200 mg bottle because they don't have the 100 mg bottle.

## 2023-03-16 ENCOUNTER — OFFICE VISIT (OUTPATIENT)
Dept: PRIMARY CARE CLINIC | Age: 58
End: 2023-03-16
Payer: COMMERCIAL

## 2023-03-16 VITALS
RESPIRATION RATE: 20 BRPM | WEIGHT: 245.8 LBS | DIASTOLIC BLOOD PRESSURE: 78 MMHG | TEMPERATURE: 97.4 F | OXYGEN SATURATION: 98 % | BODY MASS INDEX: 34.41 KG/M2 | SYSTOLIC BLOOD PRESSURE: 132 MMHG | HEIGHT: 71 IN | HEART RATE: 84 BPM

## 2023-03-16 DIAGNOSIS — I87.2 VENOUS INSUFFICIENCY: ICD-10-CM

## 2023-03-16 DIAGNOSIS — M79.89 LEG SWELLING: Primary | ICD-10-CM

## 2023-03-16 PROCEDURE — 99213 OFFICE O/P EST LOW 20 MIN: CPT | Performed by: FAMILY MEDICINE

## 2023-03-16 NOTE — PROGRESS NOTES
Identified Patient with two Patient identifiers(name and ). 1. \"Have you been to the ER, urgent care clinic since your last visit? Hospitalized since your last visit? \" No    2. \"Have you seen or consulted any other health care providers outside of the [address] [place] [address] since your last visit? \" No     3. For patients aged 39-70: Has the patient had a colonoscopy / FIT/ Cologuard? Yes - Care Gap present. Most recent result on file      If the patient is female:    4. For patients aged 41-77: Has the patient had a mammogram within the past 2 years? NA - based on age or sex      11. For patients aged 21-65: Has the patient had a pap smear? NA - based on age or sex     Visit Vitals  /78 (BP 1 Location: Left upper arm, BP Patient Position: Sitting, BP Cuff Size: Large adult)   Pulse 84   Temp 97.4 °F (36.3 °C) (Temporal)   Resp 20   Ht 5' 11\" (1.803 m)   Wt 245 lb 12.8 oz (111.5 kg)   SpO2 98%   BMI 34.28 kg/m²       Chief Complaint   Patient presents with    Rash     Both feet increase pain and red rash noted. Worse when walking.         Health Maintenance Due   Topic Date Due    COVID-19 Vaccine (1) Never done    DTaP/Tdap/Td series (1 - Tdap) Never done    Shingles Vaccine (1 of 2) Never done    Flu Vaccine (1) Never done

## 2023-03-16 NOTE — PROGRESS NOTES
HPI     Chief Complaint   Patient presents with    Rash     Both feet increase pain and red rash noted. Worse when walking. HPI:  Jasmina Orozco is a 65 y.o. male who has a history of HTN, HLD, GERD, Hypogonadism. Patient reports intermittent lower leg burning and pain with a red appearing rash that occurs at the distal legs mostly along the posterior calf and always occurs on both legs at the same time in similar location. 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 He wears pants. No concerns for insect bites. He is on his feet several hours a day and notices prolonged standing seems to trigger symptoms. He also endorses achy and burning in feet. No hx of diabetes. Allergies   Allergen Reactions    Codeine Hives    Penicillins Other (comments)       Current Outpatient Medications   Medication Sig    testosterone cypionate (DEPOTESTOTERONE CYPIONATE) 200 mg/mL injection INJECT 0.5 ML INTRAMUSCULARLY ONCE EVERY 2 WEEKS. MAX DAILY AMOUNT: 100MG    testosterone cypionate (DEPO-TESTOSTERONE) 100 mg/mL injection 100 mg by IntraMUSCular route Once every 2 weeks. Max Daily Amount: 100 mg.    omeprazole (PRILOSEC) 20 mg capsule     atorvastatin (LIPITOR) 20 mg tablet Take 1 Tablet by mouth daily.  lisinopril-hydroCHLOROthiazide (PRINZIDE, ZESTORETIC) 20-12.5 mg per tablet Take 1 Tablet by mouth daily.  Clenpiq 10 mg-3.5 gram -12 gram/160 mL soln as directed. (Patient not taking: No sig reported)    albuterol (PROVENTIL HFA, VENTOLIN HFA, PROAIR HFA) 90 mcg/actuation inhaler TAKE 1 PUFF BY INHALATION EVERY FOUR (4) HOURS AS NEEDED FOR WHEEZING. (Patient not taking: No sig reported)     No current facility-administered medications for this visit. Review of Systems   Cardiovascular:  Negative for chest pain and palpitations. Neurological:  Positive for tingling. Negative for sensory change.      Reviewed PmHx, FmHx, SocHx as well as meds and allergies, updated and dated in the chart.         Objective     Visit Vitals  /78 (BP 1 Location: Left upper arm, BP Patient Position: Sitting, BP Cuff Size: Large adult)   Pulse 84   Temp 97.4 °F (36.3 °C) (Temporal)   Resp 20   Ht 5' 11\" (1.803 m)   Wt 245 lb 12.8 oz (111.5 kg)   SpO2 98%   BMI 34.28 kg/m²     Physical Exam  Vitals and nursing note reviewed. Constitutional:       General: He is not in acute distress. Pulmonary:      Effort: Pulmonary effort is normal. No respiratory distress. Musculoskeletal:      Comments: Feet normal bilaterally without deformity. Skin:     General: Skin is warm. Coloration: Skin is not jaundiced. Findings: No bruising. Comments: Skin of lower legs normal without deformity bilaterally. Superficial varicosities were noted in the lower extremities bilaterally. Neurological:      Mental Status: He is oriented to person, place, and time. Assessment and Plan     Hx and exam concerning for venous insufficiency. Pt does not have a history of DM and glucose on last BMP stable at 108 with CBC revealing normal Hb of 16.1. No hx of contact dermatitis or insect bite. Hx and exam concerning for venous insufficiency contributing at least in part to his symptoms. Patient is to use topical ointments such as those containing capsaicin to help with his symptoms. I encouraged him to elevate his legs throughout the day and utilize compression stockings. I am referring to venous specialist.  If no signs of venous insufficiency found then consider idiopathic neuropathy. Patient went with plan of care as outlined above. Diagnoses and all orders for this visit:    1. Leg swelling    2. Venous insufficiency  -     REFERRAL TO CARDIOLOGY         As applicable:  Medication Side Effects and Warnings were discussed with patient. Patient Labs were reviewed and or requested. Patient Past Records were reviewed and or requested.     Follow-up and Dispositions    Return if symptoms worsen or fail to improve. I have discussed the diagnosis with the patient and the intended plan as seen in the above orders. The patient has received an after-visit summary and questions were answered concerning future plans. I have discussed medication side effects and warnings with the patient as well.       Tapan Baez MD  [address] [place] [address]

## 2023-05-20 DIAGNOSIS — E29.1 TESTICULAR HYPOFUNCTION: ICD-10-CM

## 2023-05-22 RX ORDER — TESTOSTERONE CYPIONATE 200 MG/ML
INJECTION, SOLUTION INTRAMUSCULAR
Qty: 2 ML | Refills: 0 | Status: SHIPPED | OUTPATIENT
Start: 2023-05-22 | End: 2023-06-21

## 2023-07-06 ENCOUNTER — TELEPHONE (OUTPATIENT)
Dept: PRIMARY CARE CLINIC | Facility: CLINIC | Age: 58
End: 2023-07-06

## 2023-07-10 ENCOUNTER — TELEPHONE (OUTPATIENT)
Dept: PRIMARY CARE CLINIC | Facility: CLINIC | Age: 58
End: 2023-07-10

## 2023-07-10 NOTE — TELEPHONE ENCOUNTER
----- Message from Geri Oliva sent at 7/10/2023  9:36 AM EDT -----  Subject: Referral Request    Reason for referral request? Pt was seen at Urgent Care on Thursday 7/6/23, he was told to follow up with his PCP for lab work to check levels   for his prostate. Pt would just like to either come in for lab work or get   a referral for a urologist. Please call pt to discuss. Provider patient wants to be referred to(if known):     Provider Phone Number(if known):     Additional Information for Provider?   ---------------------------------------------------------------------------  --------------  Josefa Mingus Huang    [phone]; OK to leave message on voicemail  ---------------------------------------------------------------------------  --------------

## 2023-07-10 NOTE — TELEPHONE ENCOUNTER
Patient seen at urgent care for urinary issues, advised to f/u with pcp in 3-5 days. Can we get this patient in possibly this week or send a urology referral for him?

## 2023-07-10 NOTE — TELEPHONE ENCOUNTER
----- Message from Erlin Sheth sent at 7/10/2023  9:35 AM EDT -----  Subject: Appointment Request    Reason for Call: Established Patient Appointment needed: Routine ED Follow   Up Visit    QUESTIONS    Reason for appointment request? No appointments available during search     Additional Information for Provider? Pt was seen at Urgent Care on   Thursday 7/6/23, he was told to follow up with his PCP for lab work to   check levels for his prostate. Pt would just like to either come in for   lab work or get a referral for a urologist. Please call pt to discuss.    ---------------------------------------------------------------------------  --------------  Elsa Avila [facility]  [phone]; OK to leave message on voicemail  ---------------------------------------------------------------------------  --------------  SCRIPT ANSWERS

## 2023-07-11 SDOH — ECONOMIC STABILITY: HOUSING INSECURITY
IN THE LAST 12 MONTHS, WAS THERE A TIME WHEN YOU DID NOT HAVE A STEADY PLACE TO SLEEP OR SLEPT IN A SHELTER (INCLUDING NOW)?: PATIENT REFUSED

## 2023-07-11 SDOH — ECONOMIC STABILITY: FOOD INSECURITY: WITHIN THE PAST 12 MONTHS, YOU WORRIED THAT YOUR FOOD WOULD RUN OUT BEFORE YOU GOT MONEY TO BUY MORE.: PATIENT DECLINED

## 2023-07-11 SDOH — ECONOMIC STABILITY: TRANSPORTATION INSECURITY
IN THE PAST 12 MONTHS, HAS LACK OF TRANSPORTATION KEPT YOU FROM MEETINGS, WORK, OR FROM GETTING THINGS NEEDED FOR DAILY LIVING?: PATIENT DECLINED

## 2023-07-11 SDOH — ECONOMIC STABILITY: FOOD INSECURITY: WITHIN THE PAST 12 MONTHS, THE FOOD YOU BOUGHT JUST DIDN'T LAST AND YOU DIDN'T HAVE MONEY TO GET MORE.: PATIENT DECLINED

## 2023-07-11 SDOH — ECONOMIC STABILITY: INCOME INSECURITY: HOW HARD IS IT FOR YOU TO PAY FOR THE VERY BASICS LIKE FOOD, HOUSING, MEDICAL CARE, AND HEATING?: PATIENT DECLINED

## 2023-07-11 NOTE — TELEPHONE ENCOUNTER
Scheduled appt for Wednesday at 10am, called patient and lvm advising of appt date and time and to call the office back if that does not work to reschedule.

## 2023-07-12 ENCOUNTER — OFFICE VISIT (OUTPATIENT)
Dept: PRIMARY CARE CLINIC | Facility: CLINIC | Age: 58
End: 2023-07-12
Payer: COMMERCIAL

## 2023-07-12 ENCOUNTER — OFFICE VISIT (OUTPATIENT)
Age: 58
End: 2023-07-12
Payer: COMMERCIAL

## 2023-07-12 VITALS
SYSTOLIC BLOOD PRESSURE: 97 MMHG | BODY MASS INDEX: 34.75 KG/M2 | DIASTOLIC BLOOD PRESSURE: 73 MMHG | TEMPERATURE: 97.9 F | HEIGHT: 71 IN | HEART RATE: 117 BPM | WEIGHT: 248.2 LBS | OXYGEN SATURATION: 96 %

## 2023-07-12 VITALS — BODY MASS INDEX: 34.16 KG/M2 | WEIGHT: 244 LBS | HEIGHT: 71 IN

## 2023-07-12 DIAGNOSIS — N41.1 CHRONIC PROSTATITIS: ICD-10-CM

## 2023-07-12 DIAGNOSIS — E29.1 TESTICULAR HYPOFUNCTION: ICD-10-CM

## 2023-07-12 DIAGNOSIS — R30.0 DYSURIA: Primary | ICD-10-CM

## 2023-07-12 DIAGNOSIS — R30.0 DYSURIA: ICD-10-CM

## 2023-07-12 DIAGNOSIS — R31.29 MICROHEMATURIA: ICD-10-CM

## 2023-07-12 LAB
BILIRUBIN, URINE, POC: NORMAL
BLOOD URINE, POC: NORMAL
GLUCOSE URINE, POC: 100
KETONES, URINE, POC: NORMAL
LEUKOCYTE ESTERASE, URINE, POC: NORMAL
NITRITE, URINE, POC: POSITIVE
PH, URINE, POC: 5 (ref 4.6–8)
PROTEIN,URINE, POC: 30
SPECIFIC GRAVITY, URINE, POC: 1.02 (ref 1–1.03)
URINALYSIS CLARITY, POC: CLEAR
URINALYSIS COLOR, POC: YELLOW
UROBILINOGEN, POC: NORMAL

## 2023-07-12 PROCEDURE — 81003 URINALYSIS AUTO W/O SCOPE: CPT | Performed by: UROLOGY

## 2023-07-12 PROCEDURE — 99213 OFFICE O/P EST LOW 20 MIN: CPT | Performed by: NURSE PRACTITIONER

## 2023-07-12 PROCEDURE — 99204 OFFICE O/P NEW MOD 45 MIN: CPT | Performed by: UROLOGY

## 2023-07-12 PROCEDURE — G8427 DOCREV CUR MEDS BY ELIG CLIN: HCPCS | Performed by: UROLOGY

## 2023-07-12 PROCEDURE — 4004F PT TOBACCO SCREEN RCVD TLK: CPT | Performed by: NURSE PRACTITIONER

## 2023-07-12 PROCEDURE — G8417 CALC BMI ABV UP PARAM F/U: HCPCS | Performed by: UROLOGY

## 2023-07-12 PROCEDURE — G8417 CALC BMI ABV UP PARAM F/U: HCPCS | Performed by: NURSE PRACTITIONER

## 2023-07-12 PROCEDURE — 3017F COLORECTAL CA SCREEN DOC REV: CPT | Performed by: NURSE PRACTITIONER

## 2023-07-12 PROCEDURE — G8427 DOCREV CUR MEDS BY ELIG CLIN: HCPCS | Performed by: NURSE PRACTITIONER

## 2023-07-12 RX ORDER — SULFAMETHOXAZOLE AND TRIMETHOPRIM 400; 80 MG/1; MG/1
1 TABLET ORAL 2 TIMES DAILY
COMMUNITY

## 2023-07-12 RX ORDER — SULFAMETHOXAZOLE AND TRIMETHOPRIM 800; 160 MG/1; MG/1
1 TABLET ORAL 2 TIMES DAILY
Qty: 28 TABLET | Refills: 0 | Status: SHIPPED | OUTPATIENT
Start: 2023-07-20

## 2023-07-12 RX ORDER — TAMSULOSIN HYDROCHLORIDE 0.4 MG/1
0.4 CAPSULE ORAL DAILY
COMMUNITY

## 2023-07-12 RX ORDER — PHENAZOPYRIDINE HYDROCHLORIDE 100 MG/1
100 TABLET, FILM COATED ORAL 3 TIMES DAILY PRN
COMMUNITY

## 2023-07-12 ASSESSMENT — ENCOUNTER SYMPTOMS
ABDOMINAL PAIN: 1
ABDOMINAL PAIN: 1
BACK PAIN: 1
BACK PAIN: 1

## 2023-07-12 NOTE — ASSESSMENT & PLAN NOTE
H/o smoking. Possibly due to prostatitis. Will evaluate upper and lower tracts.   Plan on cystoscopy, CT abd

## 2023-07-12 NOTE — ASSESSMENT & PLAN NOTE
Symptoms suspicious for prostatitis. Continue Bactrim 14 day course. Refill given. Plan on evaluation of bladder with cystoscopy.

## 2023-07-12 NOTE — PROGRESS NOTES
directed. (Patient not taking: Reported on 7/12/2023)       No current facility-administered medications for this visit. Results for orders placed or performed in visit on 07/12/23   AMB POC URINALYSIS DIP STICK AUTO W/O MICRO   Result Value Ref Range    Color, Urine, POC Yellow     Clarity, Urine, POC Clear     Glucose, Urine,  Negative    Bilirubin, Urine, POC Small Negative    Ketones, Urine, POC Trace Negative    Specific Gravity, Urine, POC 1.025 1.001 - 1.035    Blood, Urine, POC Trace-intact Negative    pH, Urine, POC 5.0 4.6 - 8.0    Protein, Urine, POC 30 Negative    Urobilinogen, POC 2 mg/dL     Nitrite, Urine, POC Positive Negative    Leukocyte Esterase, Urine, POC Large Negative       ASSESSMENT and PLAN  1. Microhematuria  Assessment & Plan:   H/o smoking. Possibly due to prostatitis. Will evaluate upper and lower tracts. Plan on cystoscopy, CT abd  Orders:  -     AMB POC URINALYSIS DIP STICK AUTO W/O MICRO  -     Culture, Urine; Future  -     Urinalysis with Microscopic; Future  -     CT ABDOMEN PELVIS W WO CONTRAST Additional Contrast? None; Future  2. Dysuria  Assessment & Plan:   Dysuria. Continue tamsulosin, pyridium PRN  Orders:  -     AMB POC URINALYSIS DIP STICK AUTO W/O MICRO  -     Culture, Urine; Future  -     Urinalysis with Microscopic; Future  -     CT ABDOMEN PELVIS W WO CONTRAST Additional Contrast? None; Future  3. Chronic prostatitis  Assessment & Plan:   Symptoms suspicious for prostatitis. Continue Bactrim 14 day course. Refill given. Plan on evaluation of bladder with cystoscopy. Return for cysto/ CMG/ uroflow next available. Chiquis Cole MD       Please note that portions of this note was potentially completed with Dragon dictation, the computer voice recognition software. Quite often unanticipated grammatical, syntax, homophones, and other interpretive errors are inadvertently transcribed by the computer software. Please disregard these errors.   Please

## 2023-07-13 LAB
APPEARANCE UR: CLEAR
BACTERIA #/AREA URNS HPF: ABNORMAL /[HPF]
BILIRUB UR QL STRIP: NEGATIVE
CASTS URNS QL MICRO: ABNORMAL /LPF
COLOR UR: ABNORMAL
CRYSTALS URNS MICRO: ABNORMAL
EPI CELLS #/AREA URNS HPF: ABNORMAL /HPF (ref 0–10)
GLUCOSE UR QL STRIP: NEGATIVE
HGB UR QL STRIP: NEGATIVE
KETONES UR QL STRIP: NEGATIVE
LEUKOCYTE ESTERASE UR QL STRIP: ABNORMAL
MICRO URNS: ABNORMAL
NITRITE UR QL STRIP: POSITIVE
PH UR STRIP: 5.5 [PH] (ref 5–7.5)
PROT UR QL STRIP: ABNORMAL
RBC #/AREA URNS HPF: ABNORMAL /HPF (ref 0–2)
SP GR UR STRIP: 1.02 (ref 1–1.03)
UNIDENT CRYS URNS QL MICRO: PRESENT
UROBILINOGEN UR STRIP-MCNC: 1 MG/DL (ref 0.2–1)
WBC #/AREA URNS HPF: ABNORMAL /HPF (ref 0–5)

## 2023-07-14 LAB — BACTERIA UR CULT: NO GROWTH

## 2023-07-25 ENCOUNTER — HOSPITAL ENCOUNTER (OUTPATIENT)
Facility: HOSPITAL | Age: 58
Discharge: HOME | End: 2023-07-28
Attending: UROLOGY
Payer: COMMERCIAL

## 2023-07-25 DIAGNOSIS — R31.29 MICROHEMATURIA: ICD-10-CM

## 2023-07-25 DIAGNOSIS — R30.0 DYSURIA: ICD-10-CM

## 2023-07-25 PROCEDURE — 74178 CT ABD&PLV WO CNTR FLWD CNTR: CPT

## 2023-07-25 PROCEDURE — 6360000004 HC RX CONTRAST MEDICATION: Performed by: UROLOGY

## 2023-07-25 RX ADMIN — IOPAMIDOL 100 ML: 755 INJECTION, SOLUTION INTRAVENOUS at 16:44

## 2023-08-08 PROBLEM — E29.1 HYPOGONADISM IN MALE: Status: ACTIVE | Noted: 2023-08-08

## 2023-08-08 PROBLEM — N40.1 BENIGN LOCALIZED PROSTATIC HYPERPLASIA WITH LOWER URINARY TRACT SYMPTOMS (LUTS): Status: ACTIVE | Noted: 2023-08-08

## 2023-08-10 ENCOUNTER — PROCEDURE VISIT (OUTPATIENT)
Age: 58
End: 2023-08-10
Payer: COMMERCIAL

## 2023-08-10 DIAGNOSIS — N41.1 CHRONIC PROSTATITIS: ICD-10-CM

## 2023-08-10 DIAGNOSIS — R30.0 DYSURIA: Primary | ICD-10-CM

## 2023-08-10 DIAGNOSIS — N40.1 BENIGN LOCALIZED PROSTATIC HYPERPLASIA WITH LOWER URINARY TRACT SYMPTOMS (LUTS): ICD-10-CM

## 2023-08-10 DIAGNOSIS — R31.29 MICROHEMATURIA: ICD-10-CM

## 2023-08-10 DIAGNOSIS — E29.1 HYPOGONADISM IN MALE: ICD-10-CM

## 2023-08-10 LAB
BILIRUBIN, URINE, POC: NEGATIVE
BLOOD URINE, POC: NEGATIVE
GLUCOSE URINE, POC: NEGATIVE
KETONES, URINE, POC: NEGATIVE
LEUKOCYTE ESTERASE, URINE, POC: NEGATIVE
NITRITE, URINE, POC: NEGATIVE
PH, URINE, POC: 7 (ref 4.6–8)
PROTEIN,URINE, POC: NEGATIVE
PVR, POC: NORMAL CC
SPECIFIC GRAVITY, URINE, POC: 1.01 (ref 1–1.03)
URINALYSIS CLARITY, POC: CLEAR
URINALYSIS COLOR, POC: YELLOW
UROBILINOGEN, POC: NORMAL

## 2023-08-10 PROCEDURE — 3017F COLORECTAL CA SCREEN DOC REV: CPT | Performed by: UROLOGY

## 2023-08-10 PROCEDURE — 4004F PT TOBACCO SCREEN RCVD TLK: CPT | Performed by: UROLOGY

## 2023-08-10 PROCEDURE — G8417 CALC BMI ABV UP PARAM F/U: HCPCS | Performed by: UROLOGY

## 2023-08-10 PROCEDURE — G8427 DOCREV CUR MEDS BY ELIG CLIN: HCPCS | Performed by: UROLOGY

## 2023-08-10 PROCEDURE — 51741 ELECTRO-UROFLOWMETRY FIRST: CPT | Performed by: UROLOGY

## 2023-08-10 PROCEDURE — 52000 CYSTOURETHROSCOPY: CPT | Performed by: UROLOGY

## 2023-08-10 PROCEDURE — 51725 SIMPLE CYSTOMETROGRAM: CPT | Performed by: UROLOGY

## 2023-08-10 PROCEDURE — 81003 URINALYSIS AUTO W/O SCOPE: CPT | Performed by: UROLOGY

## 2023-08-10 PROCEDURE — 51798 US URINE CAPACITY MEASURE: CPT | Performed by: UROLOGY

## 2023-08-10 PROCEDURE — 99214 OFFICE O/P EST MOD 30 MIN: CPT | Performed by: UROLOGY

## 2023-08-10 RX ORDER — NITROFURANTOIN 25; 75 MG/1; MG/1
100 CAPSULE ORAL 2 TIMES DAILY
Qty: 10 CAPSULE | Refills: 0 | Status: SHIPPED | OUTPATIENT
Start: 2023-08-10 | End: 2023-08-20

## 2023-08-10 RX ORDER — CIPROFLOXACIN 500 MG/1
500 TABLET, FILM COATED ORAL 2 TIMES DAILY
Qty: 28 TABLET | Refills: 1 | Status: SHIPPED | OUTPATIENT
Start: 2023-08-10

## 2023-08-10 NOTE — PROGRESS NOTES
Cystoscopy - Male    Findings:    Initial residual: moderate  Anterior urethra:  Pendulous urethra patent without strictures  Prostate: mildly coapting lateral lobes, short  Bladder neck: Normal appearing  Bladder mucosa: no tumors  no erythema      Trabeculation: 1+   Diverticula: none  Ureteral orifices: normal, efflux clear urine    CMG    Initial urge at (cc): 150  Strong urge at (cc): 225  Findings: no uninhibited contractions    Uroflow/ PVR    Max Flow: 14 ml/sec    Avg flow: 8 ml/sec    Voided Volume:  225ml    Residual Volume:0ml    Shape of the curve: Normal bell shaped curve    Impression: Normal except cloudy urine. Emptying sometimes.
interpretive errors are inadvertently transcribed by the computer software. Please disregard these errors. Please excuse any errors that have escaped final proofreading. Thank you.

## 2023-08-10 NOTE — ASSESSMENT & PLAN NOTE
Probably prostatitis.   I will double treat him with macrobid and  cipro to address any incomplete emptying

## 2023-08-10 NOTE — ASSESSMENT & PLAN NOTE
He has some improvement with Bactrim. Urine still appears slightly infected. He may not empty his bladder at times. I will put him on both Macrobid and Cipro. Morna Pipo will be for short course of 5 days. The patient was instructed on the dosing of the medication and reason for taking it. We discussed the common and serious risks. The patient is advised to be cautious of side effects with a new medication.

## 2023-08-14 ENCOUNTER — TELEPHONE (OUTPATIENT)
Age: 58
End: 2023-08-14

## 2023-08-25 ENCOUNTER — TELEPHONE (OUTPATIENT)
Dept: PRIMARY CARE CLINIC | Facility: CLINIC | Age: 58
End: 2023-08-25

## 2023-08-25 NOTE — TELEPHONE ENCOUNTER
----- Message from Debby Downs sent at 8/24/2023  2:23 PM EDT -----  Subject: Message to Provider    QUESTIONS  Information for Provider? patient need's a call back his sleep apnea   machine broke needs prescription for a new machine. been two days since he   used it needs asap.   ---------------------------------------------------------------------------  --------------  Josefa [place] Huang  [phone]; OK to leave message on voicemail  ---------------------------------------------------------------------------  --------------  SCRIPT ANSWERS  Relationship to Patient?  Self

## 2023-08-25 NOTE — TELEPHONE ENCOUNTER
Pt called stating that sleep ap machine is now broken and asking for a new prescription be sent in by PCP. Stated that he does not remember where sleep study was completed, and would like to know what the next steps are to getting this machine.

## 2023-08-28 NOTE — TELEPHONE ENCOUNTER
Please let him know he can contact the manufacteur of his machine if it has broken or he can contact the [employer] company in which he got the machine from. To order a new machine, we have to have the sleep study report that he completed.   If we don't have that then we need to complete another sleep test.

## 2023-08-29 RX ORDER — ATORVASTATIN CALCIUM 20 MG/1
TABLET, FILM COATED ORAL
Qty: 90 TABLET | Refills: 2 | Status: SHIPPED | OUTPATIENT
Start: 2023-08-29

## 2023-08-31 ENCOUNTER — TELEPHONE (OUTPATIENT)
Dept: PRIMARY CARE CLINIC | Facility: CLINIC | Age: 58
End: 2023-08-31

## 2023-08-31 DIAGNOSIS — G47.33 OBSTRUCTIVE SLEEP APNEA: Primary | ICD-10-CM

## 2023-08-31 NOTE — TELEPHONE ENCOUNTER
Called pt in ref to sleep test  AdvisedPlease let patient know that I placed a referral to sleep medicine for evaluation and retesting.

## 2023-08-31 NOTE — TELEPHONE ENCOUNTER
----- Message from Negrito Plascencia sent at 8/31/2023  8:46 AM EDT -----  Subject: Referral Request    Reason for referral request? Patient needs a new SLEEP STUDY. Patients   last sleep study was around [id number]. Patient is currently dealing with a   broken machine and has spoke with the . The    stated patient needs a new prescription to get a new machine. Please call   to assist in a new referral for a sleep study. Provider patient wants to be referred to(if known):     Provider Phone Number(if known): Additional Information for Provider?  PLEASE CALL.   ---------------------------------------------------------------------------  --------------  Elayne REILLY    [phone]; OK to leave message on voicemail  ---------------------------------------------------------------------------  --------------

## 2023-09-12 ENCOUNTER — TELEPHONE (OUTPATIENT)
Age: 58
End: 2023-09-12

## 2023-09-12 DIAGNOSIS — N41.1 CHRONIC PROSTATITIS: Primary | ICD-10-CM

## 2023-09-12 RX ORDER — DOXYCYCLINE HYCLATE 100 MG
100 TABLET ORAL 2 TIMES DAILY
Qty: 28 TABLET | Refills: 1 | Status: SHIPPED | OUTPATIENT
Start: 2023-09-12 | End: 2024-09-24

## 2023-09-12 NOTE — TELEPHONE ENCOUNTER
Pt states that the antibiotic cipro that he is taking is not working for him he thinks he still has an infection and is in pain.  Wanted to know if something else could be called in to his pharmacy for him [facility] on [address]

## 2023-09-12 NOTE — TELEPHONE ENCOUNTER
He can come drop off a urine for analysis and culture if need be. Dr. Alyce Carias was treating him for prostatitis, so that takes some time (months sometimes) to see improvement. He had some improvement maybe on Bactrim, but not 100% so I'd try doxycyline. Can you let him know I will send it and he should stop the Cipro. He should read all package inserts if not familiar with the medication and be aware of side effects/reactions. He should also let us know if he is experiencing any of the above. I did 14 days, 1 pill in the morning and 1 at night. I also gave him a refill.

## 2023-10-23 ENCOUNTER — TELEPHONE (OUTPATIENT)
Age: 58
End: 2023-10-23

## 2023-10-23 NOTE — TELEPHONE ENCOUNTER
Pt states that his medication doxycycline hyclate (VIBRA-TABS) 100 MG tablet is making his legs hurt and he can barely walk.  Wanted to know what he needed to do and wanted to speak with a nurse

## 2023-10-25 DIAGNOSIS — I10 ESSENTIAL (PRIMARY) HYPERTENSION: ICD-10-CM

## 2023-10-25 RX ORDER — LISINOPRIL AND HYDROCHLOROTHIAZIDE 20; 12.5 MG/1; MG/1
1 TABLET ORAL DAILY
Qty: 90 TABLET | Refills: 2 | Status: SHIPPED | OUTPATIENT
Start: 2023-10-25

## 2023-10-25 RX ORDER — SULFAMETHOXAZOLE AND TRIMETHOPRIM 400; 80 MG/1; MG/1
1 TABLET ORAL 2 TIMES DAILY
Qty: 42 TABLET | Refills: 0 | Status: SHIPPED | OUTPATIENT
Start: 2023-10-25 | End: 2023-11-15

## 2023-12-01 ENCOUNTER — TELEPHONE (OUTPATIENT)
Age: 58
End: 2023-12-01

## 2023-12-01 DIAGNOSIS — N41.1 CHRONIC PROSTATITIS: Primary | ICD-10-CM

## 2023-12-01 NOTE — TELEPHONE ENCOUNTER
Pt called wanting Dr. Bong Demarco to know that he finished the antibiotic that was given to him.  He could not remember the name of it but stated that it has not resolved his issue that he has been having

## 2023-12-05 NOTE — TELEPHONE ENCOUNTER
Pt called again stating he is waiting to hear back from a nurse about the antibiotic Dr. Len Rojas prescribed him for Prostate infection.

## 2023-12-05 NOTE — TELEPHONE ENCOUNTER
Dr. Suki Gil saw patient in August.  It appears maybe David Mckeon was going to send a different antibiotic in October, but I see no new orders. He did not tolerate ciprofloxacin or doxycycline. He had mild improvement on Bactrim. He has a PCN allergy. We can try another course of that or we can schedule him a visit with Dr. Suki Gil to discuss. Can you see what his preference is? Thanks.

## 2023-12-06 RX ORDER — SULFAMETHOXAZOLE AND TRIMETHOPRIM 400; 80 MG/1; MG/1
1 TABLET ORAL 2 TIMES DAILY
Qty: 42 TABLET | Refills: 0 | Status: SHIPPED | OUTPATIENT
Start: 2023-12-06 | End: 2023-12-27

## 2023-12-06 NOTE — TELEPHONE ENCOUNTER
Pt said hed prefer to try one more abx and if that still dosent help he will make a apt.    [facility] on [address]

## 2023-12-26 ENCOUNTER — TELEPHONE (OUTPATIENT)
Age: 58
End: 2023-12-26

## 2023-12-26 NOTE — TELEPHONE ENCOUNTER
An Appointment made with DR. Ring on 12/29/2023 at 8 am. Unable to put patient on a schedule as per .

## 2023-12-26 NOTE — TELEPHONE ENCOUNTER
I spoke to patient today he is still having  pain in his lower back, ribs and pelvic. He has been treated for chronic prostatitis with bactrim from 12/6/-12/27/23 and Toradol  with some symptoms of relive. Previously,he did not tolerate ciprofloxacin or doxycycline as per Kelsie Barroso notes from 12/1/2023. He wants to see Dr. Bong Demarco this year, due to big co-pays and problems with insurance  His cysto was normal on 8/10/2023. No positive urine cultures. He deferred seeing NP. He was informed that Dr. Eileen Bangura schedule had no openings.  He was offered an appointment with Dr. Bong Demarco on 12/21/23, but called back on 12/22/2023 at the end of the day

## 2023-12-26 NOTE — TELEPHONE ENCOUNTER
Received voicemail from Patient's wife Donnie Ribeiro) wanting to know why her husbands appointment that was scheduled for today had been cancelled. Please contact patient to let them know why it was cancelled, Thanks!

## 2023-12-27 NOTE — TELEPHONE ENCOUNTER
Patient has been added to the schedule on 12/29 with Dr. Gema García. Spoke with patient and confirmed appointment.

## 2023-12-29 ENCOUNTER — OFFICE VISIT (OUTPATIENT)
Age: 58
End: 2023-12-29
Payer: COMMERCIAL

## 2023-12-29 VITALS
HEART RATE: 90 BPM | WEIGHT: 240 LBS | OXYGEN SATURATION: 98 % | HEIGHT: 71 IN | RESPIRATION RATE: 16 BRPM | DIASTOLIC BLOOD PRESSURE: 77 MMHG | BODY MASS INDEX: 33.6 KG/M2 | SYSTOLIC BLOOD PRESSURE: 138 MMHG

## 2023-12-29 DIAGNOSIS — M54.42 CHRONIC BILATERAL LOW BACK PAIN WITH BILATERAL SCIATICA: Primary | ICD-10-CM

## 2023-12-29 DIAGNOSIS — M54.42 CHRONIC BILATERAL LOW BACK PAIN WITH BILATERAL SCIATICA: ICD-10-CM

## 2023-12-29 DIAGNOSIS — G89.29 CHRONIC BILATERAL LOW BACK PAIN WITH BILATERAL SCIATICA: ICD-10-CM

## 2023-12-29 DIAGNOSIS — M54.41 CHRONIC BILATERAL LOW BACK PAIN WITH BILATERAL SCIATICA: ICD-10-CM

## 2023-12-29 DIAGNOSIS — G89.29 CHRONIC BILATERAL LOW BACK PAIN WITH BILATERAL SCIATICA: Primary | ICD-10-CM

## 2023-12-29 DIAGNOSIS — E29.1 HYPOGONADISM IN MALE: ICD-10-CM

## 2023-12-29 DIAGNOSIS — N41.1 CHRONIC PROSTATITIS: ICD-10-CM

## 2023-12-29 DIAGNOSIS — M54.41 CHRONIC BILATERAL LOW BACK PAIN WITH BILATERAL SCIATICA: Primary | ICD-10-CM

## 2023-12-29 DIAGNOSIS — F41.9 ANXIETY: ICD-10-CM

## 2023-12-29 DIAGNOSIS — R30.0 DYSURIA: ICD-10-CM

## 2023-12-29 LAB
BILIRUBIN, URINE, POC: NEGATIVE
BLOOD URINE, POC: NEGATIVE
GLUCOSE URINE, POC: NEGATIVE
KETONES, URINE, POC: NEGATIVE
LEUKOCYTE ESTERASE, URINE, POC: NEGATIVE
NITRITE, URINE, POC: NEGATIVE
PH, URINE, POC: 7 (ref 4.6–8)
PROTEIN,URINE, POC: NEGATIVE
SPECIFIC GRAVITY, URINE, POC: 1.01 (ref 1–1.03)
URINALYSIS CLARITY, POC: CLEAR
URINALYSIS COLOR, POC: YELLOW
UROBILINOGEN, POC: NORMAL

## 2023-12-29 PROCEDURE — 81003 URINALYSIS AUTO W/O SCOPE: CPT | Performed by: UROLOGY

## 2023-12-29 PROCEDURE — G8427 DOCREV CUR MEDS BY ELIG CLIN: HCPCS | Performed by: UROLOGY

## 2023-12-29 PROCEDURE — 3017F COLORECTAL CA SCREEN DOC REV: CPT | Performed by: UROLOGY

## 2023-12-29 PROCEDURE — G8484 FLU IMMUNIZE NO ADMIN: HCPCS | Performed by: UROLOGY

## 2023-12-29 PROCEDURE — 99214 OFFICE O/P EST MOD 30 MIN: CPT | Performed by: UROLOGY

## 2023-12-29 PROCEDURE — G8417 CALC BMI ABV UP PARAM F/U: HCPCS | Performed by: UROLOGY

## 2023-12-29 PROCEDURE — 4004F PT TOBACCO SCREEN RCVD TLK: CPT | Performed by: UROLOGY

## 2023-12-29 RX ORDER — SULFAMETHOXAZOLE AND TRIMETHOPRIM 400; 80 MG/1; MG/1
1 TABLET ORAL 2 TIMES DAILY
COMMUNITY

## 2023-12-29 RX ORDER — SULFAMETHOXAZOLE AND TRIMETHOPRIM 800; 160 MG/1; MG/1
1 TABLET ORAL 2 TIMES DAILY
Qty: 84 TABLET | Refills: 0 | Status: SHIPPED | OUTPATIENT
Start: 2023-12-29

## 2023-12-29 NOTE — ASSESSMENT & PLAN NOTE
Physical stress and mental stress seem to be an issue for him. 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 We discussed that this affects multiple issues. He is advised to discuss coping and medication.

## 2023-12-29 NOTE — ASSESSMENT & PLAN NOTE
Musculoskeletal pains. He has a right sided trigger point. No renal colic elicited. He should discuss options with his PCP.

## 2023-12-29 NOTE — ASSESSMENT & PLAN NOTE
Dysuria. Chronic irritation, possible neurogenic issues. We discussed suppressive abx. Continue Bactrim 6 weeks.

## 2024-02-14 RX ORDER — SULFAMETHOXAZOLE AND TRIMETHOPRIM 800; 160 MG/1; MG/1
1 TABLET ORAL 2 TIMES DAILY
Qty: 60 TABLET | Refills: 1 | OUTPATIENT
Start: 2024-02-14